# Patient Record
Sex: MALE | Race: WHITE | NOT HISPANIC OR LATINO | Employment: OTHER | URBAN - NONMETROPOLITAN AREA
[De-identification: names, ages, dates, MRNs, and addresses within clinical notes are randomized per-mention and may not be internally consistent; named-entity substitution may affect disease eponyms.]

---

## 2022-03-24 ENCOUNTER — OFFICE VISIT (OUTPATIENT)
Dept: FAMILY MEDICINE CLINIC | Facility: CLINIC | Age: 42
End: 2022-03-24

## 2022-03-24 VITALS
HEART RATE: 78 BPM | WEIGHT: 194 LBS | BODY MASS INDEX: 28.73 KG/M2 | SYSTOLIC BLOOD PRESSURE: 112 MMHG | HEIGHT: 69 IN | DIASTOLIC BLOOD PRESSURE: 84 MMHG | TEMPERATURE: 97.3 F | OXYGEN SATURATION: 98 %

## 2022-03-24 DIAGNOSIS — H91.92 HEARING DIFFICULTY OF LEFT EAR: Primary | Chronic | ICD-10-CM

## 2022-03-24 DIAGNOSIS — Z13.220 SCREENING FOR HYPERLIPIDEMIA: ICD-10-CM

## 2022-03-24 DIAGNOSIS — R47.9 SPEECH IMPEDIMENT: Chronic | ICD-10-CM

## 2022-03-24 DIAGNOSIS — F33.42 RECURRENT MAJOR DEPRESSIVE DISORDER, IN FULL REMISSION: Chronic | ICD-10-CM

## 2022-03-24 PROCEDURE — 99204 OFFICE O/P NEW MOD 45 MIN: CPT | Performed by: PHYSICIAN ASSISTANT

## 2022-03-25 PROBLEM — R47.9 SPEECH IMPEDIMENT: Chronic | Status: ACTIVE | Noted: 2022-03-25

## 2022-03-25 NOTE — PROGRESS NOTES
"Subjective   Chantelle Bonilla is a 42 y.o. male.       Chief Complaint -establish care and difficulty hearing    History of Present Illness -    ROS    He is here today to establish care as a new patient.  He states that he has been relatively healthy and does not take medication for any chronic issues.  His female friend is here with him today helping with the history and translation/understanding of speech.    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  He does have associated speech impediment.  Patient states that his grandmother was deaf.    Depression-  Patient does have a history of depression.  He states that he did take medication for this in the past but is not interested in taking medication at this time.  He does have some labile moods but states he is able to control with conservative measures.  He denies any hallucinations, SI or HI.    Hearing screening-  Whisper test  Greater than 5 feet right ear  Less than 5 feet left ear      The following portions of the patient's history were reviewed and updated as appropriate: allergies, current medications, past family history, past medical history, past social history, past surgical history and problem list.    Review of Systems    Objective  Vital signs:  /84   Pulse 78   Temp 97.3 °F (36.3 °C) (Temporal)   Ht 175.3 cm (69\")   Wt 88 kg (194 lb)   SpO2 98%   BMI 28.65 kg/m²     Physical Exam  Vitals and nursing note reviewed.   Constitutional:       Appearance: Normal appearance. He is well-developed.   HENT:      Head: Normocephalic and atraumatic.      Right Ear: Tympanic membrane normal.      Left Ear: Tympanic membrane normal.      Nose: Nose normal.      Mouth/Throat:      Mouth: Mucous membranes are moist.      Pharynx: No oropharyngeal exudate or posterior oropharyngeal erythema.      Comments: Patient does have speech impediment  Eyes:      Extraocular Movements: Extraocular movements intact. "      Conjunctiva/sclera: Conjunctivae normal.   Neck:      Thyroid: No thyromegaly.      Trachea: No tracheal deviation.   Cardiovascular:      Rate and Rhythm: Normal rate and regular rhythm.      Heart sounds: Normal heart sounds. No murmur heard.  Pulmonary:      Effort: Pulmonary effort is normal. No respiratory distress.      Breath sounds: Normal breath sounds. No wheezing.   Abdominal:      General: Bowel sounds are normal.      Palpations: Abdomen is soft.      Tenderness: There is no abdominal tenderness. There is no guarding.   Musculoskeletal:         General: No tenderness. Normal range of motion.      Cervical back: Normal range of motion and neck supple.   Lymphadenopathy:      Cervical: No cervical adenopathy.   Skin:     General: Skin is warm and dry.      Findings: No rash.   Neurological:      General: No focal deficit present.      Mental Status: He is alert and oriented to person, place, and time.   Psychiatric:         Mood and Affect: Mood normal.         Behavior: Behavior normal.         Thought Content: Thought content normal.         The following data was reviewed by: JOHANNY Rico on 03/24/2022:    Discussed drawing lab work today appropriate for age                       Assessment/Plan     Diagnoses and all orders for this visit:    1. Hearing difficulty of left ear (Primary)  Comments:  Refer to ENT for formal hearing evaluation and further assessment  Orders:  -     Ambulatory Referral to ENT (Otolaryngology)    2. Recurrent major depressive disorder, in full remission (HCC)  Comments:  Advised conservative measures including breathing techniques  Advised to report any new or worsening symptoms  Orders:  -     Comprehensive Metabolic Panel; Future  -     CBC & Differential; Future  -     TSH; Future    3. Screening for hyperlipidemia  -     Lipid Panel; Future    4. Speech impediment  Comments:  Likely related to hearing loss since birth  Plan to refer to ENT/audiology for  further evaluation            Patient was given instructions and counseling regarding his condition or for health maintenance advice. Please see specific information pulled into the AVS if appropriate      This document has been electronically signed by:  Monse Walter PA-C

## 2022-03-31 ENCOUNTER — PATIENT ROUNDING (BHMG ONLY) (OUTPATIENT)
Dept: FAMILY MEDICINE CLINIC | Facility: CLINIC | Age: 42
End: 2022-03-31

## 2022-03-31 NOTE — PROGRESS NOTES
March 31, 2022    Hello, may I speak with Chantelle Bonilla?    My name is Denisse Kay      I am  with [facility]  [address] [place] [id number] [phone].    Before we get started may I verify your date of birth? 1980    I am calling to officially welcome you to our practice and ask about your recent visit. Is this a good time to talk? yes    Tell me about your visit with us. What things went well?  Everything was nice.        We're always looking for ways to make our patients' experiences even better. Do you have recommendations on ways we may improve?  no    Overall were you satisfied with your first visit to our practice? yes       I appreciate you taking the time to speak with me today. Is there anything else I can do for you? no      Thank you, and have a great day.

## 2022-04-01 ENCOUNTER — LAB (OUTPATIENT)
Dept: FAMILY MEDICINE CLINIC | Facility: CLINIC | Age: 42
End: 2022-04-01

## 2022-04-01 DIAGNOSIS — F33.42 RECURRENT MAJOR DEPRESSIVE DISORDER, IN FULL REMISSION: ICD-10-CM

## 2022-04-01 DIAGNOSIS — Z13.220 SCREENING FOR HYPERLIPIDEMIA: ICD-10-CM

## 2022-04-01 PROCEDURE — 85025 COMPLETE CBC W/AUTO DIFF WBC: CPT | Performed by: PHYSICIAN ASSISTANT

## 2022-04-01 PROCEDURE — 80053 COMPREHEN METABOLIC PANEL: CPT | Performed by: PHYSICIAN ASSISTANT

## 2022-04-01 PROCEDURE — 84443 ASSAY THYROID STIM HORMONE: CPT | Performed by: PHYSICIAN ASSISTANT

## 2022-04-01 PROCEDURE — 80061 LIPID PANEL: CPT | Performed by: PHYSICIAN ASSISTANT

## 2022-04-02 LAB
ALBUMIN SERPL-MCNC: 4.6 G/DL (ref 3.5–5.2)
ALBUMIN/GLOB SERPL: 1.8 G/DL
ALP SERPL-CCNC: 47 U/L (ref 39–117)
ALT SERPL W P-5'-P-CCNC: 20 U/L (ref 1–41)
ANION GAP SERPL CALCULATED.3IONS-SCNC: 13 MMOL/L (ref 5–15)
AST SERPL-CCNC: 19 U/L (ref 1–40)
BASOPHILS # BLD AUTO: 0.07 10*3/MM3 (ref 0–0.2)
BASOPHILS NFR BLD AUTO: 1.3 % (ref 0–1.5)
BILIRUB SERPL-MCNC: 0.5 MG/DL (ref 0–1.2)
BUN SERPL-MCNC: 9 MG/DL (ref 6–20)
BUN/CREAT SERPL: 7.6 (ref 7–25)
CALCIUM SPEC-SCNC: 8.8 MG/DL (ref 8.6–10.5)
CHLORIDE SERPL-SCNC: 104 MMOL/L (ref 98–107)
CHOLEST SERPL-MCNC: 140 MG/DL (ref 0–200)
CO2 SERPL-SCNC: 24 MMOL/L (ref 22–29)
CREAT SERPL-MCNC: 1.19 MG/DL (ref 0.76–1.27)
DEPRECATED RDW RBC AUTO: 43.2 FL (ref 37–54)
EGFRCR SERPLBLD CKD-EPI 2021: 78.7 ML/MIN/1.73
EOSINOPHIL # BLD AUTO: 0.46 10*3/MM3 (ref 0–0.4)
EOSINOPHIL NFR BLD AUTO: 8.8 % (ref 0.3–6.2)
ERYTHROCYTE [DISTWIDTH] IN BLOOD BY AUTOMATED COUNT: 13 % (ref 12.3–15.4)
GLOBULIN UR ELPH-MCNC: 2.5 GM/DL
GLUCOSE SERPL-MCNC: 86 MG/DL (ref 65–99)
HCT VFR BLD AUTO: 42.3 % (ref 37.5–51)
HDLC SERPL-MCNC: 42 MG/DL (ref 40–60)
HGB BLD-MCNC: 15.1 G/DL (ref 13–17.7)
IMM GRANULOCYTES # BLD AUTO: 0.01 10*3/MM3 (ref 0–0.05)
IMM GRANULOCYTES NFR BLD AUTO: 0.2 % (ref 0–0.5)
LDLC SERPL CALC-MCNC: 83 MG/DL (ref 0–100)
LDLC/HDLC SERPL: 1.98 {RATIO}
LYMPHOCYTES # BLD AUTO: 1.92 10*3/MM3 (ref 0.7–3.1)
LYMPHOCYTES NFR BLD AUTO: 36.7 % (ref 19.6–45.3)
MCH RBC QN AUTO: 32.6 PG (ref 26.6–33)
MCHC RBC AUTO-ENTMCNC: 35.7 G/DL (ref 31.5–35.7)
MCV RBC AUTO: 91.4 FL (ref 79–97)
MONOCYTES # BLD AUTO: 0.42 10*3/MM3 (ref 0.1–0.9)
MONOCYTES NFR BLD AUTO: 8 % (ref 5–12)
NEUTROPHILS NFR BLD AUTO: 2.35 10*3/MM3 (ref 1.7–7)
NEUTROPHILS NFR BLD AUTO: 45 % (ref 42.7–76)
NRBC BLD AUTO-RTO: 0.2 /100 WBC (ref 0–0.2)
PLATELET # BLD AUTO: 236 10*3/MM3 (ref 140–450)
PMV BLD AUTO: 11.2 FL (ref 6–12)
POTASSIUM SERPL-SCNC: 4.2 MMOL/L (ref 3.5–5.2)
PROT SERPL-MCNC: 7.1 G/DL (ref 6–8.5)
RBC # BLD AUTO: 4.63 10*6/MM3 (ref 4.14–5.8)
SODIUM SERPL-SCNC: 141 MMOL/L (ref 136–145)
TRIGL SERPL-MCNC: 75 MG/DL (ref 0–150)
TSH SERPL DL<=0.05 MIU/L-ACNC: 2.12 UIU/ML (ref 0.27–4.2)
VLDLC SERPL-MCNC: 15 MG/DL (ref 5–40)
WBC NRBC COR # BLD: 5.23 10*3/MM3 (ref 3.4–10.8)

## 2022-04-18 ENCOUNTER — TELEPHONE (OUTPATIENT)
Dept: FAMILY MEDICINE CLINIC | Facility: CLINIC | Age: 42
End: 2022-04-18

## 2022-04-18 NOTE — TELEPHONE ENCOUNTER
Caller: MELONIE OLIVIER    Relationship: Emergency Contact    Best call back number: [phone]    What does billing need from the patient: PATIENT HAS RECEIVED A BILL FROM YOUR OFFICE AND SHOULD HAVE BEEN COVERED BY HIS MEDICARE

## 2022-07-26 ENCOUNTER — TELEPHONE (OUTPATIENT)
Dept: FAMILY MEDICINE CLINIC | Facility: CLINIC | Age: 42
End: 2022-07-26

## 2022-07-26 NOTE — TELEPHONE ENCOUNTER
Caller: Quincy Bonilla    Relationship: Self    Best call back number: [phone]     What is the best time to reach you: ANYTIME  Who are you requesting to speak with (clinical staff, provider,  specific staff member): CLINICAL - GLADIS BLACK     Do you know the name of the person who called: QUINCY  What was the call regarding: THE PATIENT REPORTS THAT THE SPECIALIST THAT GLADIS BLACK SENT HIS TO AT [facility] WAS SUPPOSED TO DO A TEST AND THE PATIENT REPORTS THAT THEY DID NOT TELL THE PATIENT ANY INFORMATION ABOUT THE TEST OR VISIT. THE PATIENT STATES HE WENT TO THE APPOINTMENT TODAY, 07/26/2022 AND STATES THIS WAS HIS SECOND APPOINTMENT WITH THIS. THE PATIENT STATES THEY LOOKED AT HIS EARS BUT NOTHING ELSE.   Do you require a callback: YES, THE PATIENT IS REQUESTING A CALL BACK PLEASE.

## 2022-07-29 ENCOUNTER — OFFICE VISIT (OUTPATIENT)
Dept: FAMILY MEDICINE CLINIC | Facility: CLINIC | Age: 42
End: 2022-07-29

## 2022-07-29 VITALS
DIASTOLIC BLOOD PRESSURE: 80 MMHG | OXYGEN SATURATION: 100 % | HEART RATE: 77 BPM | WEIGHT: 189 LBS | HEIGHT: 69 IN | SYSTOLIC BLOOD PRESSURE: 108 MMHG | BODY MASS INDEX: 27.99 KG/M2 | TEMPERATURE: 98.3 F

## 2022-07-29 DIAGNOSIS — R47.9 SPEECH IMPEDIMENT: Chronic | ICD-10-CM

## 2022-07-29 DIAGNOSIS — H91.92 HEARING DIFFICULTY OF LEFT EAR: Primary | Chronic | ICD-10-CM

## 2022-07-29 DIAGNOSIS — F33.42 RECURRENT MAJOR DEPRESSIVE DISORDER, IN FULL REMISSION: Chronic | ICD-10-CM

## 2022-07-29 PROCEDURE — 99213 OFFICE O/P EST LOW 20 MIN: CPT | Performed by: PHYSICIAN ASSISTANT

## 2022-07-29 NOTE — PROGRESS NOTES
"Subjective   Chantelle Bonilla is a 42 y.o. male.       Chief Complaint -hearing difficulty    History of Present Illness -    ROS    Hearing difficulty-  Patient complains of hearing difficulty.  He was recently seen by ENT specialist but states that he was unsure of the assessment and plan at the end of the visit as he is girlfriend was unable to come back into that visit with him and he did not understand the plan.    Speech impediment-  Not at goal but stable    Depression-  Controlled with conservative measures.  He denies any SI HI or hallucinations.    The following portions of the patient's history were reviewed and updated as appropriate: allergies, current medications, past family history, past medical history, past social history, past surgical history and problem list.    Review of Systems    Objective  Vital signs:  /80   Pulse 77   Temp 98.3 °F (36.8 °C) (Temporal)   Ht 175.3 cm (69\")   Wt 85.7 kg (189 lb)   SpO2 100%   BMI 27.91 kg/m²     Physical Exam  Vitals and nursing note reviewed.   Constitutional:       General: He is not in acute distress.     Appearance: Normal appearance. He is well-developed. He is not diaphoretic.   HENT:      Head: Normocephalic and atraumatic.      Right Ear: Tympanic membrane, ear canal and external ear normal.      Left Ear: Ear canal and external ear normal.      Ears:      Comments: Clear fluid noted behind left TM without bulging or erythema appreciated     Nose: Nose normal.      Mouth/Throat:      Mouth: Mucous membranes are moist.      Pharynx: No oropharyngeal exudate or posterior oropharyngeal erythema.      Comments: Patient noted to have speech impediment  Eyes:      Extraocular Movements: Extraocular movements intact.      Conjunctiva/sclera: Conjunctivae normal.   Neck:      Thyroid: No thyromegaly.   Cardiovascular:      Rate and Rhythm: Normal rate and regular rhythm.      Heart sounds: Normal heart sounds. No murmur heard.  Pulmonary:      Effort: " Pulmonary effort is normal. No respiratory distress.      Breath sounds: Normal breath sounds. No wheezing or rales.   Musculoskeletal:         General: No tenderness.      Cervical back: Normal range of motion and neck supple.   Lymphadenopathy:      Cervical: No cervical adenopathy.   Skin:     General: Skin is warm and dry.      Findings: No rash.   Neurological:      Mental Status: He is alert and oriented to person, place, and time.   Psychiatric:         Mood and Affect: Mood normal.         Behavior: Behavior normal.         Thought Content: Thought content normal.         The following data was reviewed by: JOHANNY Rico on 07/29/2022:  CMP    CMP 4/1/22   Glucose 86   BUN 9   Creatinine 1.19   Sodium 141   Potassium 4.2   Chloride 104   Calcium 8.8   Albumin 4.60   Total Bilirubin 0.5   Alkaline Phosphatase 47   AST (SGOT) 19   ALT (SGPT) 20           CBC w/diff    CBC w/Diff 4/1/22   WBC 5.23   RBC 4.63   Hemoglobin 15.1   Hematocrit 42.3   MCV 91.4   MCH 32.6   MCHC 35.7   RDW 13.0   Platelets 236   Neutrophil Rel % 45.0   Immature Granulocyte Rel % 0.2   Lymphocyte Rel % 36.7   Monocyte Rel % 8.0   Eosinophil Rel % 8.8 (A)   Basophil Rel % 1.3   (A) Abnormal value            Lipid Panel    Lipid Panel 4/1/22   Total Cholesterol 140   Triglycerides 75   HDL Cholesterol 42   VLDL Cholesterol 15   LDL Cholesterol  83   LDL/HDL Ratio 1.98           TSH    TSH 4/1/22   TSH 2.120                      Assessment & Plan     Diagnoses and all orders for this visit:    1. Hearing difficulty of left ear (Primary)  Comments:  Request office notes/records from [facility]  Plan to contact patient after ENT plan is reviewed    2. Speech impediment  Comments:  Conservative measures advised    3. Recurrent major depressive disorder, in full remission (HCC)  Comments:  Conservative measures advised            Patient was given instructions and counseling regarding his condition or for health maintenance  advice. Please see specific information pulled into the AVS if appropriate      This document has been electronically signed by:  Monse Walter PA-C

## 2022-08-03 ENCOUNTER — TRANSCRIBE ORDERS (OUTPATIENT)
Dept: ADMINISTRATIVE | Facility: HOSPITAL | Age: 42
End: 2022-08-03

## 2022-08-03 DIAGNOSIS — H90.41 SENSORINEURAL HEARING LOSS, UNILATERAL, RIGHT EAR, WITH UNRESTRICTED HEARING ON THE CONTRALATERAL SIDE: Primary | ICD-10-CM

## 2022-10-21 ENCOUNTER — TELEPHONE (OUTPATIENT)
Dept: FAMILY MEDICINE CLINIC | Facility: CLINIC | Age: 42
End: 2022-10-21

## 2022-10-21 NOTE — TELEPHONE ENCOUNTER
Caller: Chantelle Bonilla    Relationship: Self    Best call back number: [phone]    What was the call regarding: PATIENT CALLED STATING THAT HE WILL NOT BE ABLE TO SEE GLADIS BLACK ANY MORE.  PATIENT STATED THAT HE NOW .     Do you require a callback: YES

## 2023-04-20 ENCOUNTER — TELEPHONE (OUTPATIENT)
Dept: FAMILY MEDICINE CLINIC | Facility: CLINIC | Age: 43
End: 2023-04-20

## 2023-04-20 NOTE — TELEPHONE ENCOUNTER
Yes.  I wrote a letter in the patient's chart.  She can either have the  printed out and fax it to [facility] or she can look under my chart for letters to find it.

## 2023-04-20 NOTE — TELEPHONE ENCOUNTER
Caller: MELONIE SHERMAN    Relationship: Emergency Contact    Best call back number: [phone]     What form or medical record are you requesting: FORM FOR [facility]     Who is requesting this form or medical record from you: [facility] [phone]    How would you like to receive the form or medical records (pick-up, mail, fax): FAX  If fax, what is the fax number: UNKNOWN    Timeframe paperwork needed: AS SOON AS POSSIBLE    Additional notes: THE PATIENT'S CONTACT NEEDS A STATEMENT THAT GIVES HER PERMISSION TO RIDE WITH THE PATIENT DUE TO THE PATIENT NEEDING ASSISTANCE DURING HIS APPOINTMENT.    PLEASE CALL TO ADVISE AS SOON AS POSSIBLE.     THANK YOU.

## 2023-04-25 ENCOUNTER — OFFICE VISIT (OUTPATIENT)
Dept: FAMILY MEDICINE CLINIC | Facility: CLINIC | Age: 43
End: 2023-04-25
Payer: MEDICAID

## 2023-04-25 VITALS
HEIGHT: 69 IN | SYSTOLIC BLOOD PRESSURE: 100 MMHG | HEART RATE: 78 BPM | BODY MASS INDEX: 29.18 KG/M2 | WEIGHT: 197 LBS | OXYGEN SATURATION: 96 % | DIASTOLIC BLOOD PRESSURE: 80 MMHG | TEMPERATURE: 98.5 F

## 2023-04-25 DIAGNOSIS — F51.04 PSYCHOPHYSIOLOGICAL INSOMNIA: Primary | Chronic | ICD-10-CM

## 2023-04-25 DIAGNOSIS — N46.9 INFERTILITY MALE: ICD-10-CM

## 2023-04-25 PROCEDURE — 99213 OFFICE O/P EST LOW 20 MIN: CPT | Performed by: PHYSICIAN ASSISTANT

## 2023-04-25 PROCEDURE — 1160F RVW MEDS BY RX/DR IN RCRD: CPT | Performed by: PHYSICIAN ASSISTANT

## 2023-04-25 PROCEDURE — 1159F MED LIST DOCD IN RCRD: CPT | Performed by: PHYSICIAN ASSISTANT

## 2023-04-25 RX ORDER — QUETIAPINE FUMARATE 50 MG/1
50 TABLET, FILM COATED ORAL NIGHTLY
Qty: 30 TABLET | Refills: 3 | Status: SHIPPED | OUTPATIENT
Start: 2023-04-25

## 2023-04-25 NOTE — PATIENT INSTRUCTIONS
Calorie Counting for Weight Loss  Calories are units of energy. Your body needs a certain number of calories from food to keep going throughout the day. When you eat or drink more calories than your body needs, your body stores the extra calories mostly as fat. When you eat or drink fewer calories than your body needs, your body burns fat to get the energy it needs.  Calorie counting means keeping track of how many calories you eat and drink each day. Calorie counting can be helpful if you need to lose weight. If you eat fewer calories than your body needs, you should lose weight. Ask your health care provider what a healthy weight is for you.  For calorie counting to work, you will need to eat the right number of calories each day to lose a healthy amount of weight per week. A dietitian can help you figure out how many calories you need in a day and will suggest ways to reach your calorie goal.  A healthy amount of weight to lose each week is usually 1-2 lb (0.5-0.9 kg). This usually means that your daily calorie intake should be reduced by 500-750 calories.  Eating 1,200-1,500 calories a day can help most women lose weight.  Eating 1,500-1,800 calories a day can help most men lose weight.  What do I need to know about calorie counting?  Work with your health care provider or dietitian to determine how many calories you should get each day. To meet your daily calorie goal, you will need to:  Find out how many calories are in each food that you would like to eat. Try to do this before you eat.  Decide how much of the food you plan to eat.  Keep a food log. Do this by writing down what you ate and how many calories it had.  To successfully lose weight, it is important to balance calorie counting with a healthy lifestyle that includes regular activity.  Where do I find calorie information?  The number of calories in a food can be found on a Nutrition Facts label. If a food does not have a Nutrition Facts label, try to  look up the calories online or ask your dietitian for help.  Remember that calories are listed per serving. If you choose to have more than one serving of a food, you will have to multiply the calories per serving by the number of servings you plan to eat. For example, the label on a package of bread might say that a serving size is 1 slice and that there are 90 calories in a serving. If you eat 1 slice, you will have eaten 90 calories. If you eat 2 slices, you will have eaten 180 calories.  How do I keep a food log?  After each time that you eat, record the following in your food log as soon as possible:  What you ate. Be sure to include toppings, sauces, and other extras on the food.  How much you ate. This can be measured in cups, ounces, or number of items.  How many calories were in each food and drink.  The total number of calories in the food you ate.  Keep your food log near you, such as in a pocket-sized notebook or on an marissa or website on your mobile phone. Some programs will calculate calories for you and show you how many calories you have left to meet your daily goal.  What are some portion-control tips?  Know how many calories are in a serving. This will help you know how many servings you can have of a certain food.  Use a measuring cup to measure serving sizes. You could also try weighing out portions on a kitchen scale. With time, you will be able to estimate serving sizes for some foods.  Take time to put servings of different foods on your favorite plates or in your favorite bowls and cups so you know what a serving looks like.  Try not to eat straight from a food's packaging, such as from a bag or box. Eating straight from the package makes it hard to see how much you are eating and can lead to overeating. Put the amount you would like to eat in a cup or on a plate to make sure you are eating the right portion.  Use smaller plates, glasses, and bowls for smaller portions and to prevent  overeating.  Try not to multitask. For example, avoid watching TV or using your computer while eating. If it is time to eat, sit down at a table and enjoy your food. This will help you recognize when you are full. It will also help you be more mindful of what and how much you are eating.  What are tips for following this plan?  Reading food labels  Check the calorie count compared with the serving size. The serving size may be smaller than what you are used to eating.  Check the source of the calories. Try to choose foods that are high in protein, fiber, and vitamins, and low in saturated fat, trans fat, and sodium.  Shopping  Read nutrition labels while you shop. This will help you make healthy decisions about which foods to buy.  Pay attention to nutrition labels for low-fat or fat-free foods. These foods sometimes have the same number of calories or more calories than the full-fat versions. They also often have added sugar, starch, or salt to make up for flavor that was removed with the fat.  Make a grocery list of lower-calorie foods and stick to it.  Cooking  Try to cook your favorite foods in a healthier way. For example, try baking instead of frying.  Use low-fat dairy products.  Meal planning  Use more fruits and vegetables. One-half of your plate should be fruits and vegetables.  Include lean proteins, such as chicken, turkey, and fish.  Lifestyle  Each week, aim to do one of the followin minutes of moderate exercise, such as walking.  75 minutes of vigorous exercise, such as running.  General information  Know how many calories are in the foods you eat most often. This will help you calculate calorie counts faster.  Find a way of tracking calories that works for you. Get creative. Try different apps or programs if writing down calories does not work for you.  What foods should I eat?    Eat nutritious foods. It is better to have a nutritious, high-calorie food, such as an avocado, than a food with  few nutrients, such as a bag of potato chips.  Use your calories on foods and drinks that will fill you up and will not leave you hungry soon after eating.  Examples of foods that fill you up are nuts and nut butters, vegetables, lean proteins, and high-fiber foods such as whole grains. High-fiber foods are foods with more than 5 g of fiber per serving.  Pay attention to calories in drinks. Low-calorie drinks include water and unsweetened drinks.  The items listed above may not be a complete list of foods and beverages you can eat. Contact a dietitian for more information.  What foods should I limit?  Limit foods or drinks that are not good sources of vitamins, minerals, or protein or that are high in unhealthy fats. These include:  Candy.  Other sweets.  Sodas, specialty coffee drinks, alcohol, and juice.  The items listed above may not be a complete list of foods and beverages you should avoid. Contact a dietitian for more information.  How do I count calories when eating out?  Pay attention to portions. Often, portions are much larger when eating out. Try these tips to keep portions smaller:  Consider sharing a meal instead of getting your own.  If you get your own meal, eat only half of it. Before you start eating, ask for a container and put half of your meal into it.  When available, consider ordering smaller portions from the menu instead of full portions.  Pay attention to your food and drink choices. Knowing the way food is cooked and what is included with the meal can help you eat fewer calories.  If calories are listed on the menu, choose the lower-calorie options.  Choose dishes that include vegetables, fruits, whole grains, low-fat dairy products, and lean proteins.  Choose items that are boiled, broiled, grilled, or steamed. Avoid items that are buttered, battered, fried, or served with cream sauce. Items labeled as crispy are usually fried, unless stated otherwise.  Choose water, low-fat milk,  unsweetened iced tea, or other drinks without added sugar. If you want an alcoholic beverage, choose a lower-calorie option, such as a glass of wine or light beer.  Ask for dressings, sauces, and syrups on the side. These are usually high in calories, so you should limit the amount you eat.  If you want a salad, choose a garden salad and ask for grilled meats. Avoid extra toppings such as bernabe, cheese, or fried items. Ask for the dressing on the side, or ask for olive oil and vinegar or lemon to use as dressing.  Estimate how many servings of a food you are given. Knowing serving sizes will help you be aware of how much food you are eating at restaurants.  Where to find more information  Centers for Disease Control and Prevention: www.cdc.gov  [place] Department of Agriculture: myplate.gov  Summary  Calorie counting means keeping track of how many calories you eat and drink each day. If you eat fewer calories than your body needs, you should lose weight.  A healthy amount of weight to lose per week is usually 1-2 lb (0.5-0.9 kg). This usually means reducing your daily calorie intake by 500-750 calories.  The number of calories in a food can be found on a Nutrition Facts label. If a food does not have a Nutrition Facts label, try to look up the calories online or ask your dietitian for help.  Use smaller plates, glasses, and bowls for smaller portions and to prevent overeating.  Use your calories on foods and drinks that will fill you up and not leave you hungry shortly after a meal.  This information is not intended to replace advice given to you by your health care provider. Make sure you discuss any questions you have with your health care provider.  Document Revised: 01/28/2021 Document Reviewed: 01/28/2021  Elsevier Patient Education © 2022 [employer]

## 2023-04-25 NOTE — PROGRESS NOTES
"Subjective   Chantelle Bonilla is a 43 y.o. male.       Chief Complaint -sleeping problem    History of Present Illness -    ROS    Sleeping problem-  He complains of restless sleep stating that he sometimes will \"fight\" in his sleep.  His wife states that sometimes he will flail his arms or legs like he is fighting while asleep.  Onset greater than 6 months    Infertility-  He and his wife have been trying to get her pregnant for 1 year.  She states that she does have irregular periods.  The patient has questions about his own fertility.  Neither one of them has any children.    The following portions of the patient's history were reviewed and updated as appropriate: allergies, current medications, past family history, past medical history, past social history, past surgical history and problem list.    Review of Systems    Objective  Vital signs:  /80   Pulse 78   Temp 98.5 °F (36.9 °C) (Temporal)   Ht 175.3 cm (69\")   Wt 89.4 kg (197 lb)   SpO2 96%   BMI 29.09 kg/m²     Physical Exam  Vitals and nursing note reviewed.   Constitutional:       Appearance: Normal appearance. He is well-developed.   Eyes:      Extraocular Movements: Extraocular movements intact.      Conjunctiva/sclera: Conjunctivae normal.   Cardiovascular:      Rate and Rhythm: Normal rate and regular rhythm.      Heart sounds: Normal heart sounds. No murmur heard.  Pulmonary:      Effort: Pulmonary effort is normal. No respiratory distress.      Breath sounds: Normal breath sounds. No wheezing.   Musculoskeletal:         General: No tenderness.   Skin:     General: Skin is warm and dry.      Findings: No rash.   Neurological:      Mental Status: He is alert and oriented to person, place, and time.   Psychiatric:         Mood and Affect: Mood normal.         Behavior: Behavior normal.         Thought Content: Thought content normal.         The following data was reviewed by: JOHANNY Rico on 04/25/2023:                     "       Assessment & Plan     Diagnoses and all orders for this visit:    1. Psychophysiological insomnia (Primary)  Comments:  Start Seroquel nightly as this should help the patient to sleep deeper  Discussed potential side effects  Follow-up in 2 weeks or sooner if needed  Orders:  -     QUEtiapine (SEROquel) 50 MG tablet; Take 1 tablet by mouth Every Night.  Dispense: 30 tablet; Refill: 3    2. Infertility male  Comments:  Refer to urology for sperm count and to confirm his fertility status  Orders:  -     Ambulatory Referral to Urology        BMI is >= 25 and <30. (Overweight) The following options were offered after discussion;: exercise counseling/recommendations and nutrition counseling/recommendations          Patient was given instructions and counseling regarding his condition or for health maintenance advice. Please see specific information pulled into the AVS if appropriate      This document has been electronically signed by:  Monse Walter PA-C

## 2023-04-27 DIAGNOSIS — N46.9 INFERTILITY MALE: Primary | ICD-10-CM

## 2023-04-28 ENCOUNTER — TELEPHONE (OUTPATIENT)
Dept: FAMILY MEDICINE CLINIC | Facility: CLINIC | Age: 43
End: 2023-04-28
Payer: MEDICARE

## 2023-04-28 NOTE — TELEPHONE ENCOUNTER
Spoke with patient's significant other who is on verbal and let her know that he is scheduled for semen collection on 05/03/23 at 10:00 am. I let her know that Devorah, our  said for him to come by the office on Monday or Tuesday so she can explain to him what he needs to do before having this done. She is in understanding and will let the patient know.

## 2023-05-02 ENCOUNTER — OFFICE VISIT (OUTPATIENT)
Dept: FAMILY MEDICINE CLINIC | Facility: CLINIC | Age: 43
End: 2023-05-02
Payer: MEDICAID

## 2023-05-02 VITALS — HEIGHT: 69 IN | BODY MASS INDEX: 29.18 KG/M2 | WEIGHT: 197 LBS

## 2023-05-02 DIAGNOSIS — F51.04 PSYCHOPHYSIOLOGICAL INSOMNIA: Primary | Chronic | ICD-10-CM

## 2023-05-02 NOTE — PROGRESS NOTES
"Video Telehealth Visit    This service was conducted via video including audio/visual technology through the use of [employer].    The patient has lack of transportation which necessitates this telehealth service.    The patient is located at their home.  I am located at the [facility] office.  Other participants in this video visit include patient    You have chosen to receive care through a telehealth visit.  Do you consent to use a video/audio connection for your medical care today? Yes        Subjective   Chantelle Bonilla is a 43 y.o. male.       Chief Complaint -insomnia    History of Present Illness -       Insomnia-  Improved with use of trazodone.  Patient states that he is now able to go to sleep and stay asleep throughout the night.  He denies any unwanted side effects with the use of trazodone.  We did discuss potential side effects including weight gain and patient states that he weighs the same as he did when he started the medicine 2 weeks ago.    The following portions of the patient's history were reviewed and updated as appropriate: allergies, current medications, past family history, past medical history, past social history, past surgical history and problem list.        Objective  Vital signs:  Ht 175.3 cm (69.02\")   Wt 89.4 kg (197 lb)   BMI 29.08 kg/m²     Physical Exam  Vitals and nursing note reviewed.   Constitutional:       Appearance: Normal appearance.   HENT:      Head: Normocephalic and atraumatic.   Eyes:      Extraocular Movements: Extraocular movements intact.      Conjunctiva/sclera: Conjunctivae normal.   Pulmonary:      Effort: Pulmonary effort is normal.      Breath sounds: Normal breath sounds. No wheezing.      Comments: Breath sounds appear normal without use of accessory muscles or wheezing appreciated today  Musculoskeletal:      Cervical back: Normal range of motion and neck supple.   Skin:     General: Skin is dry.      Findings: No erythema or " rash.   Neurological:      General: No focal deficit present.      Mental Status: He is alert and oriented to person, place, and time.   Psychiatric:         Mood and Affect: Mood normal.         Thought Content: Thought content normal.         The following data was reviewed by: JOHANNY Rico on 05/02/2023:         Lab Results   Component Value Date    WBC 5.23 04/01/2022    HGB 15.1 04/01/2022    HCT 42.3 04/01/2022    MCV 91.4 04/01/2022     04/01/2022    CHOL 140 04/01/2022    TRIG 75 04/01/2022    HDL 42 04/01/2022    LDL 83 04/01/2022    TSH 2.120 04/01/2022             Assessment & Plan     Diagnoses and all orders for this visit:    1. Psychophysiological insomnia (Primary)        BMI is >= 25 and <30. (Overweight) The following options were offered after discussion;: exercise counseling/recommendations and nutrition counseling/recommendations          Patient was given instructions and counseling regarding his condition or for health maintenance advice. Please see specific information pulled into the AVS if appropriate      This document has been electronically signed by:  Monse Walter PA-C

## 2023-05-02 NOTE — LETTER
May 2, 2023     Patient: Chantelle Bonilla   YOB: 1980   Date of Visit: 5/2/2023       To Whom it May Concern:    Please excuse Chantelle Bonilla from school/work on 5/2/2023 due to medical issue/illness.           Sincerely,          JOHANNY Rico        CC: No Recipients

## 2023-06-05 ENCOUNTER — OFFICE VISIT (OUTPATIENT)
Dept: FAMILY MEDICINE CLINIC | Facility: CLINIC | Age: 43
End: 2023-06-05
Payer: MEDICAID

## 2023-06-05 VITALS
OXYGEN SATURATION: 98 % | TEMPERATURE: 97.9 F | HEIGHT: 69 IN | HEART RATE: 68 BPM | DIASTOLIC BLOOD PRESSURE: 65 MMHG | BODY MASS INDEX: 28.5 KG/M2 | SYSTOLIC BLOOD PRESSURE: 100 MMHG | WEIGHT: 192.4 LBS

## 2023-06-05 DIAGNOSIS — F51.04 PSYCHOPHYSIOLOGICAL INSOMNIA: Chronic | ICD-10-CM

## 2023-06-05 DIAGNOSIS — Z00.00 HEALTH MAINTENANCE EXAMINATION: Primary | ICD-10-CM

## 2023-06-05 RX ORDER — QUETIAPINE FUMARATE 50 MG/1
50 TABLET, FILM COATED ORAL NIGHTLY
Qty: 30 TABLET | Refills: 3 | Status: SHIPPED | OUTPATIENT
Start: 2023-06-05

## 2023-06-05 NOTE — PROGRESS NOTES
The ABCs of the Annual Wellness Visit  Subsequent Medicare Wellness Visit    Subjective    Chantelle Bonilla is a 43 y.o. male who presents for a Subsequent Medicare Wellness Visit.  Past medical history is significant for chronic speech impediment, major depressive disorder, psychophysiologic insomnia, and chronic decreased hearing in the left ear.    The following portions of the patient's history were reviewed and   updated as appropriate: allergies, current medications, past family history, past medical history, past social history, past surgical history, and problem list.    Compared to one year ago, the patient feels his physical   health is the same.    Compared to one year ago, the patient feels his mental   health is the same.    Recent Hospitalizations:  He was not admitted to the hospital during the last year.     Major depressive disorder:  Psychophysiological insomnia:  Symptoms are fairly well controlled on Seroquel 50 mg nightly.  He is sleeping well.  Denies any acute concerns.  He reports his depression is controlled.    Chewing tobacco:  He has chewed tobacco for quite some time.  He occasionally will smoke a cigarette, but does not smoke routinely.    Low Blood pressure:  BP in the office today is 100/65. He will occasionally have some dizziness at times. He does drink fluids, however, generally drinks caffeinated pop.  He does frequently have a dry mouth.    Current Medical Providers:  Patient Care Team:  Giselle Burnette PA as PCP - General (Physician Assistant)    Outpatient Medications Prior to Visit   Medication Sig Dispense Refill    QUEtiapine (SEROquel) 50 MG tablet Take 1 tablet by mouth Every Night. 30 tablet 3     No facility-administered medications prior to visit.     No opioid medication identified on active medication list. I have reviewed chart for other potential  high risk medication/s and harmful drug interactions in the elderly.        Aspirin is not on active medication  "list.  Aspirin use is not indicated based on review of current medical condition/s. Risk of harm outweighs potential benefits.  .    Patient Active Problem List   Diagnosis    Recurrent major depressive disorder, in full remission    Hearing difficulty of left ear    Speech impediment    Psychophysiological insomnia     Advance Care Planning   Advance Care Planning     Advance Directive is not on file.  ACP discussion was held with the patient during this visit. Patient does not have an advance directive, information provided.     Objective    Vitals:    06/05/23 1355   BP: 100/65   Pulse: 68   Temp: 97.9 °F (36.6 °C)   TempSrc: Temporal   SpO2: 98%   Weight: 87.3 kg (192 lb 6.4 oz)   Height: 175.3 cm (69.02\")     Estimated body mass index is 28.4 kg/m² as calculated from the following:    Height as of this encounter: 175.3 cm (69.02\").    Weight as of this encounter: 87.3 kg (192 lb 6.4 oz).    BMI is >= 25 and <30. (Overweight) The following options were offered after discussion;: weight loss educational material (shared in after visit summary)    Does the patient have evidence of cognitive impairment? Yes. Chronic. Stable memory trouble.             HEALTH RISK ASSESSMENT    Smoking Status:  Social History     Tobacco Use   Smoking Status Some Days    Passive exposure: Current   Smokeless Tobacco Current    Types: Snuff     Alcohol Consumption:  Social History     Substance and Sexual Activity   Alcohol Use Never     Fall Risk Screen:    KATLYNADI Fall Risk Assessment was completed, and patient is at LOW risk for falls.Assessment completed on:6/5/2023    Depression Screening:     Row Labels 6/5/2023     1:49 PM   PHQ-2/PHQ-9 Depression Screening   Section Header. No data exists in this row.    Little Interest or Pleasure in Doing Things   0-->not at all   Feeling Down, Depressed or Hopeless   0-->not at all   PHQ-9: Brief Depression Severity Measure Score   0       Health Habits and Functional and Cognitive " Screening:     Row Labels 6/5/2023     1:58 PM   Functional & Cognitive Status   Section Header. No data exists in this row.    Do you have difficulty preparing food and eating?   No   Do you have difficulty bathing yourself, getting dressed or grooming yourself?   No   Do you have difficulty using the toilet?   No   Do you have difficulty moving around from place to place?   No   Do you have trouble with steps or getting out of a bed or a chair?   No   Current Diet   Limited Junk Food   Dental Exam   Not up to date   Eye Exam   Not up to date   Do you need help using the phone?    No   Are you deaf or do you have serious difficulty hearing?    No   Do you need help with transportation?   No   Do you need help shopping?   No   Do you need help preparing meals?    No   Do you need help with housework?    No   Do you need help with laundry?   No   Do you need help taking your medications?   No   Do you need help managing money?   No   Do you ever drive or ride in a car without wearing a seat belt?   No   Have you felt unusual stress, anger or loneliness in the last month?   No   Who do you live with?   Spouse   Have you been bothered in the last four weeks by sexual problems?   No   Do you have difficulty concentrating, remembering or making decisions?   Yes     Age-appropriate Screening Schedule:  Refer to the list below for future screening recommendations based on patient's age, sex and/or medical conditions. Orders for these recommended tests are listed in the plan section. The patient has been provided with a written plan.    Health Maintenance   Topic Date Due    COVID-19 Vaccine (1) Never done    Pneumococcal Vaccine 0-64 (1 - PCV) Never done    TDAP/TD VACCINES (1 - Tdap) Never done-Had 5 months ago with finger injury.    HEPATITIS C SCREENING  Never done    ANNUAL WELLNESS VISIT  Never done    INFLUENZA VACCINE  08/01/2023        CMS Preventative Services Quick Reference  Risk Factors Identified During  Encounter  Immunizations Discussed/Encouraged: Reports Tdap was updated in Jan 2023.  Tobacco Use/Dependance Risk: Discussed risk of continued chewing tobacco including oropharyngeal and esophageal cancers.  Encouraged on cutting back and trying to quit.  The above risks/problems have been discussed with the patient.  Pertinent information has been shared with the patient in the After Visit Summary.  An After Visit Summary and PPPS were made available to the patient.    Major depressive disorder:  Psychophysiological insomnia:  Controlled on Seroquel, continue.  Refill sent.    Chewing tobacco:  Encouraged cessation.    Low blood pressure:  Encouraged limiting caffeinated beverages and increasing fluid intake with water or Gatorade.  Routine labs pending prior to next visit.    Follow Up:   Next Medicare Wellness visit to be scheduled in 1 year.       This document has been electronically signed by JOHANNY Olivares   June 5, 2023 16:04 EDT    Please note that portions of this note were completed with a voice recognition program. Efforts were made to edit dictation, but occasionally words are mistranscribed.

## 2023-07-06 ENCOUNTER — TELEPHONE (OUTPATIENT)
Dept: FAMILY MEDICINE CLINIC | Facility: CLINIC | Age: 43
End: 2023-07-06

## 2023-07-06 NOTE — TELEPHONE ENCOUNTER
Caller: MELONIE SHERMAN    Relationship: Emergency Contact    Best call back number: [phone]     What was the call regarding:   PATIENT'S (WIFE) MELONIE STATED THAT PATIENT IS NEEDING A LETTER FROM GLADIS ORLANDO TO [facility] STATING THAT IT IS OKAY FOR HIM TO RIDE THE BUS WITH MELONIE FOR HER DOCTOR APPOINTMENT'S

## 2023-07-06 NOTE — TELEPHONE ENCOUNTER
Inform the patient that I wrote a letter for [facility].  It is in his chart.  She can access it through letters on [employer].  If she needs us to fax it to them then we can also do that for her.

## 2023-08-15 ENCOUNTER — TELEPHONE (OUTPATIENT)
Dept: FAMILY MEDICINE CLINIC | Facility: CLINIC | Age: 43
End: 2023-08-15
Payer: MEDICAID

## 2023-08-15 NOTE — TELEPHONE ENCOUNTER
CALLER MADE CONTACT TO RETURN MISSED CALL FROM RENEA LARIOS RELAYED MESSAGE AS GIVEN:    Renea Garcia MA     TM     8:49 AM  Note      Left vm for pt to return call.   We have received a letter of medical necessity for [facility] to transport patient to Dr aMbel PAULSON dentist in [facility]. We need to know what treatment pt is receiving at this dentist office to place on form to be able to fax back to [facility]. - HUB TO READ.         CALLER STATED PATIENT IS SCHEDULED FOR A NEW PATIENT APPOINTMENT FOR A BASIC CHECK   1

## 2023-08-15 NOTE — TELEPHONE ENCOUNTER
Left vm for pt to return call.   We have received a letter of medical necessity for [facility] to transport patient to Dr Mabel PAULSON dentist in [facility]. We need to know what treatment pt is receiving at this dentist office to place on form to be able to fax back to [facility]. - HUB TO READ.

## 2023-08-21 RX ORDER — PERMETHRIN 50 MG/G
1 CREAM TOPICAL ONCE
Qty: 60 G | Refills: 0 | Status: SHIPPED | OUTPATIENT
Start: 2023-08-21 | End: 2023-08-21

## 2023-08-29 ENCOUNTER — OFFICE VISIT (OUTPATIENT)
Dept: FAMILY MEDICINE CLINIC | Facility: CLINIC | Age: 43
End: 2023-08-29
Payer: MEDICAID

## 2023-08-29 VITALS
HEIGHT: 69 IN | HEART RATE: 78 BPM | OXYGEN SATURATION: 98 % | DIASTOLIC BLOOD PRESSURE: 70 MMHG | TEMPERATURE: 97.2 F | BODY MASS INDEX: 27.25 KG/M2 | WEIGHT: 184 LBS | SYSTOLIC BLOOD PRESSURE: 126 MMHG

## 2023-08-29 DIAGNOSIS — L03.313 CELLULITIS OF CHEST WALL: Primary | ICD-10-CM

## 2023-08-29 DIAGNOSIS — F33.42 RECURRENT MAJOR DEPRESSIVE DISORDER, IN FULL REMISSION: Chronic | ICD-10-CM

## 2023-08-29 DIAGNOSIS — F51.01 PRIMARY INSOMNIA: Chronic | ICD-10-CM

## 2023-08-29 PROCEDURE — 1160F RVW MEDS BY RX/DR IN RCRD: CPT | Performed by: PHYSICIAN ASSISTANT

## 2023-08-29 PROCEDURE — 99214 OFFICE O/P EST MOD 30 MIN: CPT | Performed by: PHYSICIAN ASSISTANT

## 2023-08-29 PROCEDURE — 1159F MED LIST DOCD IN RCRD: CPT | Performed by: PHYSICIAN ASSISTANT

## 2023-08-29 RX ORDER — SULFAMETHOXAZOLE AND TRIMETHOPRIM 800; 160 MG/1; MG/1
1 TABLET ORAL 2 TIMES DAILY
Qty: 20 TABLET | Refills: 0 | Status: SHIPPED | OUTPATIENT
Start: 2023-08-29

## 2023-08-29 NOTE — PROGRESS NOTES
"Subjective   Chantelle Bonilla is a 43 y.o. male.       Chief Complaint -skin redness    History of Present Illness -       Skin redness-  He complains of redness and irritation on the left chest after bug bites recently.  Onset 1 week    Insomnia-  Significantly improved with the use of melatonin.    Depression-  Stable with out medication at this time.  He denies any hallucinations, SI or HI,    The following portions of the patient's history were reviewed and updated as appropriate: allergies, current medications, past family history, past medical history, past social history, past surgical history and problem list.        Objective  Vital signs:  /70   Pulse 78   Temp 97.2 øF (36.2 øC) (Temporal)   Ht 175.3 cm (69\")   Wt 83.5 kg (184 lb)   SpO2 98%   BMI 27.17 kg/mý     Physical Exam  Vitals and nursing note reviewed.   Constitutional:       Appearance: Normal appearance. He is well-developed.   Eyes:      Extraocular Movements: Extraocular movements intact.      Conjunctiva/sclera: Conjunctivae normal.   Cardiovascular:      Rate and Rhythm: Normal rate and regular rhythm.      Heart sounds: Normal heart sounds. No murmur heard.  Pulmonary:      Effort: Pulmonary effort is normal. No respiratory distress.      Breath sounds: Normal breath sounds. No wheezing.   Musculoskeletal:         General: No tenderness.   Skin:     General: Skin is warm and dry.      Findings: Erythema present. No rash.      Comments: Erythematous soft tissue noted left chest approximately 10 x 10 cm    Neurological:      Mental Status: He is alert and oriented to person, place, and time.   Psychiatric:         Mood and Affect: Mood normal.         Behavior: Behavior normal.         Thought Content: Thought content normal.       The following data was reviewed by Monse Walter PA-C:         Lab Results   Component Value Date    BUN 16 06/23/2023    CREATININE 1.20 06/23/2023    EGFR 77.4 06/23/2023    ALT 19 06/23/2023    AST 15 " 06/23/2023    WBC 6.93 06/23/2023    HGB 15.0 06/23/2023    HCT 42.9 06/23/2023     06/23/2023    CHOL 151 06/23/2023    TRIG 60 06/23/2023    HDL 42 06/23/2023    LDL 97 06/23/2023    TSH 1.200 06/23/2023    HGBA1C 5.00 06/23/2023           Assessment & Plan     Diagnoses and all orders for this visit:    1. Cellulitis of chest wall (Primary)  Comments:  Start Bactrim  Advised to keep the area clean with antibacterial soap  RTC if not improving within 1 week  Orders:  -     sulfamethoxazole-trimethoprim (Bactrim DS) 800-160 MG per tablet; Take 1 tablet by mouth 2 (Two) Times a Day.  Dispense: 20 tablet; Refill: 0    2. Primary insomnia  Comments:  Continue melatonin use  Advised tips for healthy sleep including avoidance of caffeine  Orders:  -     Melatonin 3 MG capsule; Take 1 capsule by mouth At Night As Needed (Sleep).  Dispense: 30 capsule; Refill: 5    3. Recurrent major depressive disorder, in full remission  Comments:  Advised conservative measures for dealing with depression and stress                   Patient was given instructions and counseling regarding his condition or for health maintenance advice. Please see specific information pulled into the AVS if appropriate      This document has been electronically signed by:  Monse Walter PA-C

## 2023-10-25 ENCOUNTER — TELEPHONE (OUTPATIENT)
Dept: FAMILY MEDICINE CLINIC | Facility: CLINIC | Age: 43
End: 2023-10-25

## 2023-10-25 NOTE — TELEPHONE ENCOUNTER
Caller: WHITMELONIE    Relationship: Emergency Contact    Best call back number: [phone]     What form or medical record are you requesting: STATEMENT THAT APPROVES PATIENT AND HIS WIFE MELONIE TO BE TRANSPORTED BY [facility]    Who is requesting this form or medical record from you: [facility] TRANSPORTATION    How would you like to receive the form or medical records (pick-up, mail, fax): FAX    If fax, what is the fax number: UNKNOWN (?)    Timeframe paperwork needed: ASAP

## 2023-12-21 ENCOUNTER — OFFICE VISIT (OUTPATIENT)
Dept: FAMILY MEDICINE CLINIC | Facility: CLINIC | Age: 43
End: 2023-12-21
Payer: MEDICAID

## 2023-12-21 VITALS
DIASTOLIC BLOOD PRESSURE: 72 MMHG | SYSTOLIC BLOOD PRESSURE: 114 MMHG | BODY MASS INDEX: 27.11 KG/M2 | TEMPERATURE: 97.8 F | HEIGHT: 69 IN | WEIGHT: 183 LBS | HEART RATE: 76 BPM | OXYGEN SATURATION: 98 %

## 2023-12-21 DIAGNOSIS — B37.9 CANDIDIASIS: Primary | ICD-10-CM

## 2023-12-21 PROBLEM — H91.90 HEARING LOSS: Status: ACTIVE | Noted: 2022-11-21

## 2023-12-21 PROBLEM — H91.90 HEARING LOSS: Status: RESOLVED | Noted: 2022-11-21 | Resolved: 2023-12-21

## 2023-12-21 PROCEDURE — 99213 OFFICE O/P EST LOW 20 MIN: CPT | Performed by: PHYSICIAN ASSISTANT

## 2023-12-21 PROCEDURE — 1159F MED LIST DOCD IN RCRD: CPT | Performed by: PHYSICIAN ASSISTANT

## 2023-12-21 PROCEDURE — 1160F RVW MEDS BY RX/DR IN RCRD: CPT | Performed by: PHYSICIAN ASSISTANT

## 2023-12-21 RX ORDER — QUETIAPINE FUMARATE 50 MG/1
TABLET, FILM COATED ORAL
COMMUNITY
Start: 2023-09-13 | End: 2023-12-21

## 2023-12-21 RX ORDER — FLUCONAZOLE 150 MG/1
150 TABLET ORAL DAILY
Qty: 10 TABLET | Refills: 0 | Status: SHIPPED | OUTPATIENT
Start: 2023-12-21

## 2023-12-21 NOTE — PROGRESS NOTES
"Chief Complaint -rash    History of Present Illness -     Chantelle Bonilla is a 43 y.o. male.     Rash-  Patient complains of a rash in his mid buttock fold with associated pruritus extending down to anus.  He reports intermittent dried red blood on toilet paper after defecation 2 weeks ago.  He denies any bright red blood in toilet water or black stool.      The following portions of the patient's history were reviewed and updated as appropriate: allergies, current medications, past family history, past medical history, past social history, past surgical history and problem list.        Objective  Vital signs:  /72   Pulse 76   Temp 97.8 °F (36.6 °C) (Temporal)   Ht 175.3 cm (69\")   Wt 83 kg (183 lb)   SpO2 98%   BMI 27.02 kg/m²     Physical Exam  Vitals and nursing note reviewed.   Constitutional:       Appearance: Normal appearance. He is well-developed.   Eyes:      Extraocular Movements: Extraocular movements intact.      Conjunctiva/sclera: Conjunctivae normal.   Cardiovascular:      Rate and Rhythm: Normal rate and regular rhythm.      Heart sounds: Normal heart sounds. No murmur heard.  Pulmonary:      Effort: Pulmonary effort is normal. No respiratory distress.      Breath sounds: Normal breath sounds. No wheezing.   Musculoskeletal:         General: No tenderness.   Skin:     General: Skin is warm and dry.      Findings: Rash present.      Comments: Erythematous moist skin noted mid buttock fold extending approximately 8 cm down to include anus with centralized skin tear   Neurological:      Mental Status: He is alert and oriented to person, place, and time.   Psychiatric:         Mood and Affect: Mood normal.         Behavior: Behavior normal.         Thought Content: Thought content normal.         The following data was reviewed by Monse Walter PA-C:         Lab Results   Component Value Date    BUN 16 06/23/2023    CREATININE 1.20 06/23/2023    EGFR 77.4 06/23/2023    ALT 19 06/23/2023    AST 15 " 06/23/2023    WBC 6.93 06/23/2023    HGB 15.0 06/23/2023    HCT 42.9 06/23/2023     06/23/2023    CHOL 151 06/23/2023    TRIG 60 06/23/2023    HDL 42 06/23/2023    LDL 97 06/23/2023    TSH 1.200 06/23/2023    HGBA1C 5.00 06/23/2023           Assessment & Plan     Diagnoses and all orders for this visit:    1. Candidiasis (Primary)  Comments:  Advised to mix Bactroban and Desitin to encourage healing and advised keeping the area dry to prevent recurrence  Orders:  -     mupirocin (BACTROBAN) 2 % ointment; Apply 1 application  topically to the appropriate area as directed 3 (Three) Times a Day.  Dispense: 60 g; Refill: 1  -     zinc oxide (Desitin) 40 % paste paste; Apply  topically to the appropriate area as directed 2 (Two) Times a Day.  Dispense: 113 g; Refill: 0  -     fluconazole (Diflucan) 150 MG tablet; Take 1 tablet by mouth Daily.  Dispense: 10 tablet; Refill: 0                   Patient was given instructions and counseling regarding his condition or for health maintenance advice. Please see specific information pulled into the AVS if appropriate      This document has been electronically signed by:  Monse Walter PA-C

## 2024-02-20 ENCOUNTER — OFFICE VISIT (OUTPATIENT)
Dept: FAMILY MEDICINE CLINIC | Facility: CLINIC | Age: 44
End: 2024-02-20
Payer: MEDICAID

## 2024-02-20 VITALS
BODY MASS INDEX: 26.81 KG/M2 | DIASTOLIC BLOOD PRESSURE: 70 MMHG | HEIGHT: 69 IN | SYSTOLIC BLOOD PRESSURE: 114 MMHG | RESPIRATION RATE: 14 BRPM | HEART RATE: 70 BPM | WEIGHT: 181 LBS | OXYGEN SATURATION: 96 %

## 2024-02-20 DIAGNOSIS — Z13.220 SCREENING FOR HYPERLIPIDEMIA: ICD-10-CM

## 2024-02-20 DIAGNOSIS — F51.04 PSYCHOPHYSIOLOGICAL INSOMNIA: Chronic | ICD-10-CM

## 2024-02-20 DIAGNOSIS — F33.42 RECURRENT MAJOR DEPRESSIVE DISORDER, IN FULL REMISSION: Chronic | ICD-10-CM

## 2024-02-20 DIAGNOSIS — L30.9 DERMATITIS: Primary | ICD-10-CM

## 2024-02-20 PROCEDURE — 99214 OFFICE O/P EST MOD 30 MIN: CPT | Performed by: PHYSICIAN ASSISTANT

## 2024-02-20 PROCEDURE — 1159F MED LIST DOCD IN RCRD: CPT | Performed by: PHYSICIAN ASSISTANT

## 2024-02-20 PROCEDURE — 1160F RVW MEDS BY RX/DR IN RCRD: CPT | Performed by: PHYSICIAN ASSISTANT

## 2024-02-20 RX ORDER — TRIAMCINOLONE ACETONIDE 5 MG/G
1 OINTMENT TOPICAL 2 TIMES DAILY
Qty: 60 G | Refills: 1 | Status: SHIPPED | OUTPATIENT
Start: 2024-02-20

## 2024-02-20 NOTE — PROGRESS NOTES
"Chief Complaint -rash    History of Present Illness -     Chantelle Bonilla is a 44 y.o. male.     His wife Jennifer is here with him today helping with history.    Rash-  Patient complains of a pruritic rash mid buttock.  Some relief with Desitin and mupirocin but itching persists.  Patient has completed Diflucan 10-day treatment in the past with some improvement but rash not resolved.    Depression-  Stable with psychotherapy.  Patient goes to comprehensive care in [place].  He denies any hallucinations, SI or HI.    Insomnia-  Stable with melatonin and good sleep schedule      The following portions of the patient's history were reviewed and updated as appropriate: allergies, current medications, past family history, past medical history, past social history, past surgical history and problem list.        Objective  Vital signs:  /70   Pulse 70   Resp 14   Ht 175.3 cm (69.02\")   Wt 82.1 kg (181 lb)   SpO2 96%   BMI 26.72 kg/m²     Physical Exam  Vitals and nursing note reviewed.   Constitutional:       Appearance: Normal appearance. He is well-developed.   Eyes:      Extraocular Movements: Extraocular movements intact.      Conjunctiva/sclera: Conjunctivae normal.   Cardiovascular:      Rate and Rhythm: Normal rate and regular rhythm.      Heart sounds: Normal heart sounds. No murmur heard.  Pulmonary:      Effort: Pulmonary effort is normal. No respiratory distress.      Breath sounds: Normal breath sounds. No wheezing.   Musculoskeletal:         General: No tenderness.   Skin:     General: Skin is warm and dry.      Findings: Rash present.      Comments: Bright pink plaque type rash noted mid buttock   Neurological:      Mental Status: He is alert and oriented to person, place, and time.   Psychiatric:         Mood and Affect: Mood normal.         Behavior: Behavior normal.         Thought Content: Thought content normal.         The following data was reviewed by Monse Walter PA-C:         Lab Results "   Component Value Date    BUN 16 06/23/2023    CREATININE 1.20 06/23/2023    EGFR 77.4 06/23/2023    ALT 19 06/23/2023    AST 15 06/23/2023    WBC 6.93 06/23/2023    HGB 15.0 06/23/2023    HCT 42.9 06/23/2023     06/23/2023    CHOL 151 06/23/2023    TRIG 60 06/23/2023    HDL 42 06/23/2023    LDL 97 06/23/2023    TSH 1.200 06/23/2023    HGBA1C 5.00 06/23/2023           Assessment & Plan     Diagnoses and all orders for this visit:    1. Dermatitis (Primary)  Comments:  Advised to mix Triamcinalone and Desitin to encourage healing and advised keeping the area dry to prevent recurrence  Orders:  -     zinc oxide (Desitin) 40 % paste paste; Apply  topically to the appropriate area as directed 2 (Two) Times a Day.  Dispense: 113 g; Refill: 1  -     triamcinolone (KENALOG) 0.5 % ointment; Apply 1 Application topically to the appropriate area as directed 2 (Two) Times a Day.  Dispense: 60 g; Refill: 1    2. Recurrent major depressive disorder, in full remission  Comments:  [facility] with [facility]  Orders:  -     Comprehensive Metabolic Panel; Future    3. Psychophysiological insomnia  Comments:  Continue melatonin  Advised to avoid caffeine and advised to maintain regular sleep schedule  Orders:  -     Comprehensive Metabolic Panel; Future    4. Screening for hyperlipidemia  -     Lipid Panel; Future                   Patient was given instructions and counseling regarding his condition or for health maintenance advice. Please see specific information pulled into the AVS if appropriate      This document has been electronically signed by:  Monse Walter PA-C

## 2024-03-19 ENCOUNTER — OFFICE VISIT (OUTPATIENT)
Dept: FAMILY MEDICINE CLINIC | Facility: CLINIC | Age: 44
End: 2024-03-19
Payer: MEDICAID

## 2024-03-19 VITALS
OXYGEN SATURATION: 98 % | TEMPERATURE: 98.8 F | BODY MASS INDEX: 27.25 KG/M2 | HEART RATE: 56 BPM | SYSTOLIC BLOOD PRESSURE: 130 MMHG | HEIGHT: 69 IN | WEIGHT: 184 LBS | DIASTOLIC BLOOD PRESSURE: 80 MMHG

## 2024-03-19 DIAGNOSIS — F51.04 PSYCHOPHYSIOLOGICAL INSOMNIA: Chronic | ICD-10-CM

## 2024-03-19 DIAGNOSIS — L30.9 DERMATITIS: Primary | ICD-10-CM

## 2024-03-19 PROCEDURE — 99213 OFFICE O/P EST LOW 20 MIN: CPT | Performed by: PHYSICIAN ASSISTANT

## 2024-03-19 PROCEDURE — 1160F RVW MEDS BY RX/DR IN RCRD: CPT | Performed by: PHYSICIAN ASSISTANT

## 2024-03-19 PROCEDURE — 1159F MED LIST DOCD IN RCRD: CPT | Performed by: PHYSICIAN ASSISTANT

## 2024-03-19 NOTE — PROGRESS NOTES
"Chief Complaint -rash    History of Present Illness -     Chantelle Bonilla is a 44 y.o. male.     Rash-  Patient is here for follow-up on rash on his mid buttock fold.  There was associated pruritus which has significantly improved with the use of Desitin, Diflucan, and triamcinolone ointment.  Onset 3 months.    Insomnia-  Controlled with melatonin      The following portions of the patient's history were reviewed and updated as appropriate: allergies, current medications, past family history, past medical history, past social history, past surgical history and problem list.        Objective  Vital signs:  /80   Pulse 56   Temp 98.8 °F (37.1 °C) (Temporal)   Ht 175.3 cm (69.02\")   Wt 83.5 kg (184 lb)   SpO2 98%   BMI 27.16 kg/m²     Physical Exam  Vitals and nursing note reviewed.   Constitutional:       Appearance: Normal appearance. He is well-developed.   Eyes:      Extraocular Movements: Extraocular movements intact.      Conjunctiva/sclera: Conjunctivae normal.   Cardiovascular:      Rate and Rhythm: Normal rate and regular rhythm.      Heart sounds: Normal heart sounds. No murmur heard.  Pulmonary:      Effort: Pulmonary effort is normal. No respiratory distress.      Breath sounds: Normal breath sounds. No wheezing.   Musculoskeletal:         General: No tenderness.   Skin:     General: Skin is warm and dry.      Findings: Rash (Significantly improved with flesh-colored area mid buttock.  There is some discoloration around the area of dermatitis which is likely residual) present.   Neurological:      Mental Status: He is alert and oriented to person, place, and time.   Psychiatric:         Mood and Affect: Mood normal.         Behavior: Behavior normal.         Thought Content: Thought content normal.         The following data was reviewed by Monse Walter PA-C:         Lab Results   Component Value Date    BUN 16 06/23/2023    CREATININE 1.20 06/23/2023    EGFR 77.4 06/23/2023    ALT 19 06/23/2023    " AST 15 06/23/2023    WBC 6.93 06/23/2023    HGB 15.0 06/23/2023    HCT 42.9 06/23/2023     06/23/2023    CHOL 151 06/23/2023    TRIG 60 06/23/2023    HDL 42 06/23/2023    LDL 97 06/23/2023    TSH 1.200 06/23/2023    HGBA1C 5.00 06/23/2023           Assessment & Plan     Diagnoses and all orders for this visit:    1. Dermatitis (Primary)  Comments:  Resolving  Advised use of zinc oxide and triamcinolone as needed    2. Psychophysiological insomnia  Comments:  Continue melatonin  Advised tips for healthy sleep including avoidance of caffeine                   Patient was given instructions and counseling regarding his condition or for health maintenance advice. Please see specific information pulled into the AVS if appropriate      This document has been electronically signed by:  Monse Walter PA-C

## 2024-05-22 ENCOUNTER — TELEPHONE (OUTPATIENT)
Dept: FAMILY MEDICINE CLINIC | Facility: CLINIC | Age: 44
End: 2024-05-22

## 2024-05-22 NOTE — TELEPHONE ENCOUNTER
Caller: MELONIE SHERMAN    Relationship: Emergency Contact    Best call back number: [phone]     What is the best time to reach you: ANY    Who are you requesting to speak with (clinical staff, provider,  specific staff member): NURSE    Do you know the name of the person who called: WIFE    What was the call regarding: [facility] WANTS A FAX STATING IT IS OKAY FOR PATIENT TO HAVE A CHAPERON WITH HIM DURING TRANSPORTATION.      Is it okay if the provider responds through MyChart: CALL IF NEEDED

## 2024-05-23 NOTE — TELEPHONE ENCOUNTER
Faxed. Unable to reach the patient. Could not leave voicemail. Will try again later.    .Hub to read/relay!

## 2024-06-21 ENCOUNTER — LAB (OUTPATIENT)
Dept: FAMILY MEDICINE CLINIC | Facility: CLINIC | Age: 44
End: 2024-06-21
Payer: MEDICAID

## 2024-06-21 DIAGNOSIS — F33.42 RECURRENT MAJOR DEPRESSIVE DISORDER, IN FULL REMISSION: Chronic | ICD-10-CM

## 2024-06-21 DIAGNOSIS — Z13.220 SCREENING FOR HYPERLIPIDEMIA: ICD-10-CM

## 2024-06-21 DIAGNOSIS — F51.04 PSYCHOPHYSIOLOGICAL INSOMNIA: Chronic | ICD-10-CM

## 2024-06-21 PROCEDURE — 80061 LIPID PANEL: CPT | Performed by: PHYSICIAN ASSISTANT

## 2024-06-21 PROCEDURE — 36415 COLL VENOUS BLD VENIPUNCTURE: CPT | Performed by: PHYSICIAN ASSISTANT

## 2024-06-21 PROCEDURE — 80053 COMPREHEN METABOLIC PANEL: CPT | Performed by: PHYSICIAN ASSISTANT

## 2024-06-22 LAB
ALBUMIN SERPL-MCNC: 4.5 G/DL (ref 3.5–5.2)
ALBUMIN/GLOB SERPL: 1.9 G/DL
ALP SERPL-CCNC: 40 U/L (ref 39–117)
ALT SERPL W P-5'-P-CCNC: 20 U/L (ref 1–41)
ANION GAP SERPL CALCULATED.3IONS-SCNC: 7 MMOL/L (ref 5–15)
AST SERPL-CCNC: 22 U/L (ref 1–40)
BILIRUB SERPL-MCNC: 0.4 MG/DL (ref 0–1.2)
BUN SERPL-MCNC: 17 MG/DL (ref 6–20)
BUN/CREAT SERPL: 15.2 (ref 7–25)
CALCIUM SPEC-SCNC: 9.1 MG/DL (ref 8.6–10.5)
CHLORIDE SERPL-SCNC: 106 MMOL/L (ref 98–107)
CHOLEST SERPL-MCNC: 145 MG/DL (ref 0–200)
CO2 SERPL-SCNC: 28 MMOL/L (ref 22–29)
CREAT SERPL-MCNC: 1.12 MG/DL (ref 0.76–1.27)
EGFRCR SERPLBLD CKD-EPI 2021: 83.6 ML/MIN/1.73
GLOBULIN UR ELPH-MCNC: 2.4 GM/DL
GLUCOSE SERPL-MCNC: 91 MG/DL (ref 65–99)
HDLC SERPL-MCNC: 45 MG/DL (ref 40–60)
LDLC SERPL CALC-MCNC: 85 MG/DL (ref 0–100)
LDLC/HDLC SERPL: 1.89 {RATIO}
POTASSIUM SERPL-SCNC: 4.7 MMOL/L (ref 3.5–5.2)
PROT SERPL-MCNC: 6.9 G/DL (ref 6–8.5)
SODIUM SERPL-SCNC: 141 MMOL/L (ref 136–145)
TRIGL SERPL-MCNC: 74 MG/DL (ref 0–150)
VLDLC SERPL-MCNC: 15 MG/DL (ref 5–40)

## 2024-07-01 ENCOUNTER — TELEPHONE (OUTPATIENT)
Dept: FAMILY MEDICINE CLINIC | Facility: CLINIC | Age: 44
End: 2024-07-01
Payer: MEDICAID

## 2024-07-01 NOTE — TELEPHONE ENCOUNTER
Caller: MELONIE SHERMAN    Relationship: Emergency Contact    Best call back number: [phone]        What specialty or service is being requested: R TECH TRANSPORTATION     Any additional details: THE PATIENTS WIFE WOULD LIKE TO GET A NEW REFERRAL FOR R [facility] FOR THE PATIENT

## 2024-07-02 NOTE — TELEPHONE ENCOUNTER
Spoke to patients wife she says that he is needing [facility] to pick him up for dentist appointment in [place]. Faxed a request for this appointment to [facility] for this patient on the office fax cover sheet.

## 2024-07-15 ENCOUNTER — TELEPHONE (OUTPATIENT)
Dept: FAMILY MEDICINE CLINIC | Facility: CLINIC | Age: 44
End: 2024-07-15

## 2024-07-15 NOTE — TELEPHONE ENCOUNTER
Hub staff attempted to follow warm transfer process and was unsuccessful     Caller: MELONIE SHERMAN    Relationship to patient: Emergency Contact    Best call back number: [phone]     Patient is needing: MELONIE CALLED TO STATE THAT A VOICEMAIL WAS RECEIVED TO RETURN A CALL, THOUGH SHE'S NOT SURE FOR WHAT REASON    PLEASE ADVISE

## 2024-07-15 NOTE — TELEPHONE ENCOUNTER
MELONIE IS CALLING TO  ASK YOU TO SEND A REFERRAL TO [facility] FOR [facility] TO GO TO HIS DENTAL APPT [id number] 3.30 PM.  THEY HAD TO RESCHEDULE THE APPT

## 2024-08-20 ENCOUNTER — OFFICE VISIT (OUTPATIENT)
Dept: FAMILY MEDICINE CLINIC | Facility: CLINIC | Age: 44
End: 2024-08-20
Payer: MEDICAID

## 2024-08-20 VITALS
DIASTOLIC BLOOD PRESSURE: 60 MMHG | OXYGEN SATURATION: 99 % | HEIGHT: 69 IN | SYSTOLIC BLOOD PRESSURE: 114 MMHG | HEART RATE: 56 BPM | WEIGHT: 188 LBS | BODY MASS INDEX: 27.85 KG/M2 | TEMPERATURE: 98.4 F

## 2024-08-20 DIAGNOSIS — F51.01 PRIMARY INSOMNIA: Chronic | ICD-10-CM

## 2024-08-20 DIAGNOSIS — F33.42 RECURRENT MAJOR DEPRESSIVE DISORDER, IN FULL REMISSION: Primary | ICD-10-CM

## 2024-08-20 PROCEDURE — 1159F MED LIST DOCD IN RCRD: CPT | Performed by: PHYSICIAN ASSISTANT

## 2024-08-20 PROCEDURE — 99213 OFFICE O/P EST LOW 20 MIN: CPT | Performed by: PHYSICIAN ASSISTANT

## 2024-08-20 PROCEDURE — 1160F RVW MEDS BY RX/DR IN RCRD: CPT | Performed by: PHYSICIAN ASSISTANT

## 2024-08-20 NOTE — PROGRESS NOTES
"Chief Complaint -insomnia    History of Present Illness -     Chantelle Bonilla is a 44 y.o. male.     Insomnia-  Patient reports his insomnia is improved with keeping a regular sleep schedule.  He states he does still use the melatonin on an as-needed basis.    Depression-  Controlled without medication at this time.  He denies any hallucinations, SI or HI      The following portions of the patient's history were reviewed and updated as appropriate: allergies, current medications, past family history, past medical history, past social history, past surgical history and problem list.        Objective  Vital signs:  /60   Pulse 56   Temp 98.4 °F (36.9 °C) (Temporal)   Ht 175.3 cm (69.02\")   Wt 85.3 kg (188 lb)   SpO2 99%   BMI 27.75 kg/m²     Physical Exam  Vitals and nursing note reviewed.   Constitutional:       Appearance: Normal appearance. He is well-developed.   Eyes:      Extraocular Movements: Extraocular movements intact.      Conjunctiva/sclera: Conjunctivae normal.   Cardiovascular:      Rate and Rhythm: Regular rhythm. Bradycardia present.      Heart sounds: Normal heart sounds. No murmur heard.  Pulmonary:      Effort: Pulmonary effort is normal. No respiratory distress.      Breath sounds: Normal breath sounds. No wheezing.   Musculoskeletal:         General: No tenderness.   Skin:     General: Skin is warm and dry.      Findings: No rash.   Neurological:      Mental Status: He is alert and oriented to person, place, and time.   Psychiatric:         Mood and Affect: Mood normal.         Behavior: Behavior normal.         Thought Content: Thought content normal.         The following data was reviewed by Monse Walter PA-C:         Lab Results   Component Value Date    BUN 17 06/21/2024    CREATININE 1.12 06/21/2024    EGFR 83.6 06/21/2024    ALT 20 06/21/2024    AST 22 06/21/2024    WBC 6.93 06/23/2023    HGB 15.0 06/23/2023    HCT 42.9 06/23/2023     06/23/2023    CHOL 145 06/21/2024    TRIG " 74 06/21/2024    HDL 45 06/21/2024    LDL 85 06/21/2024    TSH 1.200 06/23/2023    HGBA1C 5.00 06/23/2023           Assessment & Plan     Diagnoses and all orders for this visit:    1. Recurrent major depressive disorder, in full remission (Primary)  Comments:  Continue to monitor and report any symptoms of depression    2. Primary insomnia  Comments:  Continue melatonin use  Advised tips for healthy sleep including avoidance of caffeine  Orders:  -     Melatonin 3 MG capsule; Take 1 capsule by mouth At Night As Needed (Sleep).  Dispense: 30 capsule; Refill: 5        BMI is >= 25 and <30. (Overweight) The following options were offered after discussion;: exercise counseling/recommendations and nutrition counseling/recommendations          Patient was given instructions and counseling regarding his condition or for health maintenance advice. Please see specific information pulled into the AVS if appropriate      This document has been electronically signed by:  Monse Walter PA-C

## 2024-10-08 ENCOUNTER — OFFICE VISIT (OUTPATIENT)
Dept: FAMILY MEDICINE CLINIC | Facility: CLINIC | Age: 44
End: 2024-10-08
Payer: MEDICAID

## 2024-10-08 VITALS
HEART RATE: 80 BPM | SYSTOLIC BLOOD PRESSURE: 120 MMHG | TEMPERATURE: 99.1 F | OXYGEN SATURATION: 98 % | BODY MASS INDEX: 28.58 KG/M2 | DIASTOLIC BLOOD PRESSURE: 80 MMHG | WEIGHT: 193 LBS | HEIGHT: 69 IN

## 2024-10-08 DIAGNOSIS — F51.04 PSYCHOPHYSIOLOGICAL INSOMNIA: Chronic | ICD-10-CM

## 2024-10-08 DIAGNOSIS — L30.9 DERMATITIS: Primary | ICD-10-CM

## 2024-10-08 DIAGNOSIS — L40.9 PSORIASIS OF SCALP: Chronic | ICD-10-CM

## 2024-10-08 PROCEDURE — 1159F MED LIST DOCD IN RCRD: CPT | Performed by: PHYSICIAN ASSISTANT

## 2024-10-08 PROCEDURE — 1160F RVW MEDS BY RX/DR IN RCRD: CPT | Performed by: PHYSICIAN ASSISTANT

## 2024-10-08 PROCEDURE — 99214 OFFICE O/P EST MOD 30 MIN: CPT | Performed by: PHYSICIAN ASSISTANT

## 2024-10-08 RX ORDER — KETOCONAZOLE 20 MG/G
1 CREAM TOPICAL DAILY
Qty: 60 G | Refills: 0 | Status: SHIPPED | OUTPATIENT
Start: 2024-10-08

## 2024-10-08 RX ORDER — CLOBETASOL PROPIONATE 0.05 G/100ML
SHAMPOO TOPICAL DAILY
Qty: 118 ML | Refills: 3 | Status: SHIPPED | OUTPATIENT
Start: 2024-10-08

## 2024-10-08 NOTE — PROGRESS NOTES
"Chief Complaint -rash    History of Present Illness -     Chantelle Bonilla is a 44 y.o. male.     Rash-  Patient complains of a moist raw feeling rash in the folds of the buttock.  He has had similar rash before that resolved with use of triamcinolone and antifungal.    Scalp itch-  Patient complains of itchy scalp with dry red plaques.  Onset greater than 6 months.    Insomnia-  Stable with melatonin and conservative measures      The following portions of the patient's history were reviewed and updated as appropriate: allergies, current medications, past family history, past medical history, past social history, past surgical history and problem list.        Objective  Vital signs:  /80   Pulse 80   Temp 99.1 °F (37.3 °C) (Temporal)   Ht 175.3 cm (69.02\")   Wt 87.5 kg (193 lb)   SpO2 98%   BMI 28.48 kg/m²     Physical Exam  Vitals and nursing note reviewed.   Constitutional:       Appearance: Normal appearance. He is well-developed.   Eyes:      Extraocular Movements: Extraocular movements intact.      Conjunctiva/sclera: Conjunctivae normal.   Cardiovascular:      Rate and Rhythm: Normal rate and regular rhythm.      Heart sounds: Normal heart sounds. No murmur heard.  Pulmonary:      Effort: Pulmonary effort is normal. No respiratory distress.      Breath sounds: Normal breath sounds. No wheezing.   Musculoskeletal:         General: No tenderness.   Skin:     General: Skin is warm and dry.      Findings: Rash present.      Comments: Erythematous moist skin noted in the folds of buttock.  Erythematous plaque with white scales noted     Neurological:      Mental Status: He is alert and oriented to person, place, and time.   Psychiatric:         Mood and Affect: Mood normal.         Behavior: Behavior normal.         Thought Content: Thought content normal.         The following data was reviewed by Monse Walter PA-C:         Lab Results   Component Value Date    BUN 17 06/21/2024    CREATININE 1.12 " 06/21/2024    EGFR 83.6 06/21/2024    ALT 20 06/21/2024    AST 22 06/21/2024    WBC 6.93 06/23/2023    HGB 15.0 06/23/2023    HCT 42.9 06/23/2023     06/23/2023    CHOL 145 06/21/2024    TRIG 74 06/21/2024    HDL 45 06/21/2024    LDL 85 06/21/2024    TSH 1.200 06/23/2023    HGBA1C 5.00 06/23/2023           Assessment & Plan     Diagnoses and all orders for this visit:    1. Dermatitis (Primary)  Comments:  Dermatitis of buttock/jock itch  Start ketoconazole cream and Desitin  Orders:  -     ketoconazole (NIZORAL) 2 % cream; Apply 1 Application topically to the appropriate area as directed Daily.  Dispense: 60 g; Refill: 0  -     zinc oxide (Desitin) 40 % paste paste; Apply  topically to the appropriate area as directed 2 (Two) Times a Day As Needed (rash).  Dispense: 99 g; Refill: 0    2. Psoriasis of scalp  Comments:  Start clobetasol shampoo  Orders:  -     clobetasol propionate (CLOBEX) 0.05 % shampoo; Apply  topically to the appropriate area as directed Daily.  Dispense: 118 mL; Refill: 3    3. Psychophysiological insomnia  Comments:  Continue melatonin                   Patient was given instructions and counseling regarding his condition or for health maintenance advice. Please see specific information pulled into the AVS if appropriate      This document has been electronically signed by:  Monse Walter PA-C

## 2024-11-08 ENCOUNTER — OFFICE VISIT (OUTPATIENT)
Dept: FAMILY MEDICINE CLINIC | Facility: CLINIC | Age: 44
End: 2024-11-08
Payer: MEDICAID

## 2024-11-08 VITALS
HEIGHT: 69 IN | OXYGEN SATURATION: 98 % | WEIGHT: 190 LBS | BODY MASS INDEX: 28.14 KG/M2 | TEMPERATURE: 97.3 F | DIASTOLIC BLOOD PRESSURE: 70 MMHG | SYSTOLIC BLOOD PRESSURE: 110 MMHG | HEART RATE: 61 BPM

## 2024-11-08 DIAGNOSIS — L30.9 DERMATITIS: Primary | ICD-10-CM

## 2024-11-08 DIAGNOSIS — L40.9 PSORIASIS OF SCALP: Chronic | ICD-10-CM

## 2024-11-08 PROBLEM — L21.9 SEBORRHEIC DERMATITIS: Status: RESOLVED | Noted: 2024-11-08 | Resolved: 2024-11-08

## 2024-11-08 PROBLEM — L21.9 SEBORRHEIC DERMATITIS: Status: ACTIVE | Noted: 2024-11-08

## 2024-11-08 PROCEDURE — 1160F RVW MEDS BY RX/DR IN RCRD: CPT | Performed by: PHYSICIAN ASSISTANT

## 2024-11-08 PROCEDURE — 1159F MED LIST DOCD IN RCRD: CPT | Performed by: PHYSICIAN ASSISTANT

## 2024-11-08 PROCEDURE — 99213 OFFICE O/P EST LOW 20 MIN: CPT | Performed by: PHYSICIAN ASSISTANT

## 2024-11-08 RX ORDER — KETOCONAZOLE 20 MG/G
1 CREAM TOPICAL DAILY
Qty: 60 G | Refills: 0 | Status: SHIPPED | OUTPATIENT
Start: 2024-11-08

## 2024-11-08 NOTE — PROGRESS NOTES
"Chief Complaint -rash    History of Present Illness -     Chantelle Bonilla is a 44 y.o. male.     His wife Jennifer is here with him today helping with history.    Rash-  Patient complains of erythematous rash in the mid buttock crack.  Some relief with use of Desitin and ketoconazole cream.  Patient states that he takes a shower and then does not put on the medication until the next morning.  We discussed that he should dry off immediately after and apply the medication at night and again in the morning if needed    Seborrheic dermatitis-  Patient reports not at goal with the use of clobetasol shampoo however it is improving.  He is using the shampoo every 2-3 days.      The following portions of the patient's history were reviewed and updated as appropriate: allergies, current medications, past family history, past medical history, past social history, past surgical history and problem list.        Objective  Vital signs:  /70   Pulse 61   Temp 97.3 °F (36.3 °C) (Temporal)   Ht 175.3 cm (69.02\")   Wt 86.2 kg (190 lb)   SpO2 98%   BMI 28.04 kg/m²     Physical Exam  Vitals and nursing note reviewed.   Constitutional:       Appearance: Normal appearance. He is well-developed.   Eyes:      Extraocular Movements: Extraocular movements intact.      Conjunctiva/sclera: Conjunctivae normal.   Cardiovascular:      Rate and Rhythm: Normal rate and regular rhythm.      Heart sounds: Normal heart sounds. No murmur heard.  Pulmonary:      Effort: Pulmonary effort is normal. No respiratory distress.      Breath sounds: Normal breath sounds. No wheezing.   Musculoskeletal:         General: No tenderness.   Skin:     General: Skin is warm and dry.      Findings: No rash.      Comments: Mildly erythematous plaques with white scales noted on scalp.  Significantly improved from previous visit.  Erythematous skin noted and buttock crack with intermittent breakage in skin and mild erythema.   Neurological:      Mental Status: He is " alert and oriented to person, place, and time.   Psychiatric:         Mood and Affect: Mood normal.         Behavior: Behavior normal.         Thought Content: Thought content normal.         The following data was reviewed by Monse Walter PA-C:         Lab Results   Component Value Date    BUN 17 06/21/2024    CREATININE 1.12 06/21/2024    EGFR 83.6 06/21/2024    ALT 20 06/21/2024    AST 22 06/21/2024    WBC 6.93 06/23/2023    HGB 15.0 06/23/2023    HCT 42.9 06/23/2023     06/23/2023    CHOL 145 06/21/2024    TRIG 74 06/21/2024    HDL 45 06/21/2024    LDL 85 06/21/2024    TSH 1.200 06/23/2023    HGBA1C 5.00 06/23/2023           Assessment & Plan     Diagnoses and all orders for this visit:    1. Dermatitis (Primary)  Comments:  Dermatitis of buttock/jock itch  Use ketoconazole cream and Desitin     Advised to keep the area dry and apply every night  Orders:  -     ketoconazole (NIZORAL) 2 % cream; Apply 1 Application topically to the appropriate area as directed Daily.  Dispense: 60 g; Refill: 0  -     zinc oxide (Desitin) 40 % paste paste; Apply  topically to the appropriate area as directed 2 (Two) Times a Day As Needed (rash).  Dispense: 99 g; Refill: 0    2. Psoriasis of scalp  Comments:  Advised to use clobetasol shampoo daily  Encouraged to gently exfoliate scalp while shampooing                   Patient was given instructions and counseling regarding his condition or for health maintenance advice. Please see specific information pulled into the AVS if appropriate      This document has been electronically signed by:  Monse Walter PA-C

## 2024-11-25 ENCOUNTER — OFFICE VISIT (OUTPATIENT)
Dept: FAMILY MEDICINE CLINIC | Facility: CLINIC | Age: 44
End: 2024-11-25
Payer: MEDICAID

## 2024-11-25 VITALS
WEIGHT: 198 LBS | HEART RATE: 61 BPM | SYSTOLIC BLOOD PRESSURE: 110 MMHG | OXYGEN SATURATION: 99 % | TEMPERATURE: 97.6 F | BODY MASS INDEX: 29.33 KG/M2 | HEIGHT: 69 IN | DIASTOLIC BLOOD PRESSURE: 64 MMHG

## 2024-11-25 DIAGNOSIS — N46.9 INFERTILITY MALE: Chronic | ICD-10-CM

## 2024-11-25 DIAGNOSIS — N50.89 SCROTAL MASS: Primary | ICD-10-CM

## 2024-11-25 DIAGNOSIS — L40.9 PSORIASIS OF SCALP: Chronic | ICD-10-CM

## 2024-11-25 PROCEDURE — 99214 OFFICE O/P EST MOD 30 MIN: CPT | Performed by: PHYSICIAN ASSISTANT

## 2024-11-25 PROCEDURE — 1160F RVW MEDS BY RX/DR IN RCRD: CPT | Performed by: PHYSICIAN ASSISTANT

## 2024-11-25 PROCEDURE — 1159F MED LIST DOCD IN RCRD: CPT | Performed by: PHYSICIAN ASSISTANT

## 2024-11-25 NOTE — PROGRESS NOTES
"Chief Complaint -lump     History of Present Illness -     Chantelle Bonilla is a 44 y.o. male who is here today with his wife who is helping with history.    Lump-  Patient complains of a hard nonmobile nontender lump noted on his inferior scrotal sac.  Onset 2 days.    Infertility-  Patient and his wife have been trying to get her pregnant for over a year.  She is went to GYN but is not currently undergoing infertility treatments.  Patient is interested in making sure his sperm are viable.    Psoriasis-  Stable with over-the-counter dandruff shampoo      The following portions of the patient's history were reviewed and updated as appropriate: allergies, current medications, past family history, past medical history, past social history, past surgical history and problem list.        Objective  Vital signs:  /64   Pulse 61   Temp 97.6 °F (36.4 °C)   Ht 175.3 cm (69\")   Wt 89.8 kg (198 lb)   SpO2 99%   BMI 29.24 kg/m²     Physical Exam  Vitals and nursing note reviewed.   Constitutional:       Appearance: Normal appearance. He is well-developed.   Eyes:      Extraocular Movements: Extraocular movements intact.      Conjunctiva/sclera: Conjunctivae normal.   Cardiovascular:      Rate and Rhythm: Normal rate and regular rhythm.      Heart sounds: Normal heart sounds. No murmur heard.  Pulmonary:      Effort: Pulmonary effort is normal. No respiratory distress.      Breath sounds: Normal breath sounds. No wheezing.   Genitourinary:     Comments: Approximately 1 x 1/2 cm hard nontender flesh-colored nonmobile subcutaneous hardening of skin noted inferior mid testicular region  Musculoskeletal:         General: No tenderness.   Skin:     General: Skin is warm and dry.      Findings: No rash.   Neurological:      Mental Status: He is alert and oriented to person, place, and time.   Psychiatric:         Mood and Affect: Mood normal.         Behavior: Behavior normal.         Thought Content: Thought content normal. "         The following data was reviewed by Monse Walter PA-C:         Lab Results   Component Value Date    BUN 17 06/21/2024    CREATININE 1.12 06/21/2024    EGFR 83.6 06/21/2024    ALT 20 06/21/2024    AST 22 06/21/2024    WBC 6.93 06/23/2023    HGB 15.0 06/23/2023    HCT 42.9 06/23/2023     06/23/2023    CHOL 145 06/21/2024    TRIG 74 06/21/2024    HDL 45 06/21/2024    LDL 85 06/21/2024    TSH 1.200 06/23/2023    HGBA1C 5.00 06/23/2023           Assessment & Plan     Diagnoses and all orders for this visit:    1. Scrotal mass (Primary)  Comments:  Since patient asymptomatic and no pain this does not appear to be an urgent issue.  Order ultrasound for further evaluation  Orders:  -     US scrotum and testicles; Future  -     Ambulatory Referral to Urology    2. Infertility male  Comments:  Refer to urology due to scrotal lump as possible sperm count  Orders:  -     Ambulatory Referral to Urology    3. Psoriasis of scalp  Comments:  Continue over-the-counter dandruff shampoo                   Patient was given instructions and counseling regarding his condition or for health maintenance advice. Please see specific information pulled into the AVS if appropriate      This document has been electronically signed by:  Monse Walter PA-C

## 2024-12-03 DIAGNOSIS — L30.9 DERMATITIS: ICD-10-CM

## 2024-12-03 RX ORDER — KETOCONAZOLE 20 MG/G
1 CREAM TOPICAL DAILY
Qty: 60 G | Refills: 0 | Status: SHIPPED | OUTPATIENT
Start: 2024-12-03

## 2024-12-03 NOTE — TELEPHONE ENCOUNTER
Caller: MELONIE SHERMAN    Relationship: Emergency Contact    Best call back number: [phone]     Requested Prescriptions:   Requested Prescriptions     Pending Prescriptions Disp Refills    ketoconazole (NIZORAL) 2 % cream 60 g 0     Sig: Apply 1 Application topically to the appropriate area as directed Daily.    zinc oxide (Desitin) 40 % paste paste 99 g 0     Sig: Apply  topically to the appropriate area as directed 2 (Two) Times a Day As Needed (rash).        Pharmacy where request should be sent: [address] - [phone] [facility] [phone] [facility]     Last office visit with prescribing clinician: 11/25/2024   Last telemedicine visit with prescribing clinician: Visit date not found   Next office visit with prescribing clinician: 2/13/2025     Additional details provided by patient:     Does the patient have less than a 3 day supply:  [] Yes  [x] No    Would you like a call back once the refill request has been completed: [] Yes [] No    If the office needs to give you a call back, can they leave a voicemail: [] Yes [x] No    Valente Ma Rep   12/03/24 15:14 EST

## 2024-12-05 ENCOUNTER — HOSPITAL ENCOUNTER (OUTPATIENT)
Dept: ULTRASOUND IMAGING | Facility: HOSPITAL | Age: 44
Discharge: HOME | End: 2024-12-05
Admitting: PHYSICIAN ASSISTANT
Payer: MEDICAID

## 2024-12-05 DIAGNOSIS — N50.89 SCROTAL MASS: ICD-10-CM

## 2024-12-05 PROCEDURE — 76870 US EXAM SCROTUM: CPT

## 2024-12-06 ENCOUNTER — TELEPHONE (OUTPATIENT)
Dept: FAMILY MEDICINE CLINIC | Facility: CLINIC | Age: 44
End: 2024-12-06

## 2024-12-06 NOTE — TELEPHONE ENCOUNTER
Inform the patient that the ultrasound of his scrotum and testicles did show bilateral hydroceles.  Hydrocele is a fluid-filled sac in the scrotum.  It should not affect his function or ability to reproduce.

## 2024-12-06 NOTE — TELEPHONE ENCOUNTER
Caller: Chantelle Bonilla    Relationship: Self    Best call back number:     Caller requesting test results: PATIENT    What test was performed: US    When was the test performed: 12/05/24    Where was the test performed: [facility]    Additional notes: PLEASE CALL PATIENT WITH RESULTS         t.”

## 2024-12-09 NOTE — TELEPHONE ENCOUNTER
Please inform the patient that a hydrocele is not anything serious.  There is not anything to be done with this issue.

## 2024-12-09 NOTE — TELEPHONE ENCOUNTER
Name: MELONIE SHERMAN    Relationship: Emergency Contact    Best Callback Number: [phone]     HUB PROVIDED RELAY MESSAGE HOWEVER, PATIENT WIFE HAD FURTHER QUESTIONS, TRANSFERRED TO OFFICE.

## 2024-12-09 NOTE — TELEPHONE ENCOUNTER
Pt is aware of this information.  He wants to know if this is anything serious and if he needs to have anything done with it.

## 2025-02-24 ENCOUNTER — OFFICE VISIT (OUTPATIENT)
Dept: FAMILY MEDICINE CLINIC | Facility: CLINIC | Age: 45
End: 2025-02-24
Payer: MEDICAID

## 2025-02-24 VITALS
OXYGEN SATURATION: 99 % | BODY MASS INDEX: 27.43 KG/M2 | RESPIRATION RATE: 18 BRPM | HEIGHT: 69 IN | SYSTOLIC BLOOD PRESSURE: 107 MMHG | DIASTOLIC BLOOD PRESSURE: 68 MMHG | HEART RATE: 66 BPM | WEIGHT: 185.2 LBS

## 2025-02-24 DIAGNOSIS — F51.04 PSYCHOPHYSIOLOGICAL INSOMNIA: Chronic | ICD-10-CM

## 2025-02-24 DIAGNOSIS — F33.42 RECURRENT MAJOR DEPRESSIVE DISORDER, IN FULL REMISSION: Primary | Chronic | ICD-10-CM

## 2025-02-24 PROBLEM — N43.3 HYDROCELE OF TESTIS: Status: ACTIVE | Noted: 2025-02-24

## 2025-02-24 PROCEDURE — 99213 OFFICE O/P EST LOW 20 MIN: CPT | Performed by: PHYSICIAN ASSISTANT

## 2025-02-24 PROCEDURE — 1159F MED LIST DOCD IN RCRD: CPT | Performed by: PHYSICIAN ASSISTANT

## 2025-02-24 PROCEDURE — 1160F RVW MEDS BY RX/DR IN RCRD: CPT | Performed by: PHYSICIAN ASSISTANT

## 2025-02-24 NOTE — PROGRESS NOTES
"Chief Complaint -depression    History of Present Illness -     Chantelle Bonilla is a 45 y.o. male who is here today with his wife who is helping with history.    Depression-  Controlled with conservative measures.  Patient denies any hallucinations, SI or HI.    Insomnia-  Stable with avoidance of caffeine and keeping a regular sleep schedule.      The following portions of the patient's history were reviewed and updated as appropriate: allergies, current medications, past family history, past medical history, past social history, past surgical history and problem list.        Objective  Vital signs:  /68 (BP Location: Right arm, Patient Position: Sitting, Cuff Size: Adult)   Pulse 66   Resp 18   Ht 175.3 cm (69\")   Wt 84 kg (185 lb 3.2 oz)   SpO2 99%   BMI 27.35 kg/m²     Physical Exam  Vitals and nursing note reviewed.   Constitutional:       Appearance: Normal appearance. He is well-developed.   Eyes:      Extraocular Movements: Extraocular movements intact.      Conjunctiva/sclera: Conjunctivae normal.   Cardiovascular:      Rate and Rhythm: Normal rate and regular rhythm.      Heart sounds: Normal heart sounds. No murmur heard.  Pulmonary:      Effort: Pulmonary effort is normal. No respiratory distress.      Breath sounds: Normal breath sounds. No wheezing.   Musculoskeletal:         General: No tenderness.   Skin:     General: Skin is warm and dry.      Findings: No rash.   Neurological:      Mental Status: He is alert and oriented to person, place, and time.   Psychiatric:         Mood and Affect: Mood normal.         Behavior: Behavior normal.         Thought Content: Thought content normal.         The following data was reviewed by Monse Walter PA-C:         Lab Results   Component Value Date    BUN 17 06/21/2024    CREATININE 1.12 06/21/2024    EGFR 83.6 06/21/2024    ALT 20 06/21/2024    AST 22 06/21/2024    WBC 6.93 06/23/2023    HGB 15.0 06/23/2023    HCT 42.9 06/23/2023     06/23/2023 "    CHOL 145 06/21/2024    TRIG 74 06/21/2024    HDL 45 06/21/2024    LDL 85 06/21/2024    TSH 1.200 06/23/2023    HGBA1C 5.00 06/23/2023           Assessment & Plan     Diagnoses and all orders for this visit:    1. Recurrent major depressive disorder, in full remission (Primary)  Comments:  Continue to monitor    2. Psychophysiological insomnia  Comments:  Advised conservative measures including avoidance of caffeine                   Patient was given instructions and counseling regarding his condition or for health maintenance advice. Please see specific information pulled into the AVS if appropriate      This document has been electronically signed by:  Monse Walter PA-C

## 2025-04-24 ENCOUNTER — TELEPHONE (OUTPATIENT)
Dept: FAMILY MEDICINE CLINIC | Facility: CLINIC | Age: 45
End: 2025-04-24

## 2025-04-24 DIAGNOSIS — H91.93 BILATERAL HEARING LOSS, UNSPECIFIED HEARING LOSS TYPE: Primary | ICD-10-CM

## 2025-04-24 NOTE — TELEPHONE ENCOUNTER
Inform the patient I placed a referral for [facility] audiology.  They should be able to evaluate him for hearing loss and possible hearing aids.

## 2025-04-24 NOTE — TELEPHONE ENCOUNTER
Caller: MELONIE SHERMAN    Relationship: Emergency Contact    Best call back number:     Equipment requested: HEARING AIDS    Reason for the request: PROBLEMS HEARING     Prescribing Provider: GLADIS BLACK     Additional information or concerns: THE PATIENT JUST WANTS IT TO BE FROM A GOOD PROVIDER.    INSURANCE [facility]  PHONE   ID NUMBER [id number]  GROUP [id number]  PLEASE CALL TO GET APPROVAL  THEY HAVE TALKED TO THE INSURANCE CO

## 2025-05-01 ENCOUNTER — TELEPHONE (OUTPATIENT)
Dept: FAMILY MEDICINE CLINIC | Facility: CLINIC | Age: 45
End: 2025-05-01
Payer: MEDICAID

## 2025-05-01 NOTE — TELEPHONE ENCOUNTER
----- Message from Monse Walter sent at 5/1/2025  4:19 PM EDT -----  Regarding: RE: referral request  I wrote a letter in [employer] allowing him to have a  to ride with him on [employer].  He can access it on [employer] or we can fax it over if needed.  ----- Message -----  From: Connie Morin CMA  Sent: 5/1/2025   1:23 PM EDT  To: JOHANNY Rico  Subject: referral request                                 Patients mother called the office requesting a referral to be made for the patient to be able to have an escort with him on RT[employer].

## 2025-05-01 NOTE — TELEPHONE ENCOUNTER
Spoke to patients mother in regards to having a referral placed for the patient to be able to have an escort on [employer], Sent PCP staff message.

## 2025-05-01 NOTE — TELEPHONE ENCOUNTER
Spoke with patient concerning referral for [facility], Patient is going to call the office back with a fax number to send referral to.

## 2025-05-06 ENCOUNTER — TELEPHONE (OUTPATIENT)
Dept: FAMILY MEDICINE CLINIC | Facility: CLINIC | Age: 45
End: 2025-05-06

## 2025-05-06 DIAGNOSIS — H91.93 HEARING DIFFICULTY OF BOTH EARS: Primary | ICD-10-CM

## 2025-05-06 NOTE — TELEPHONE ENCOUNTER
Caller: MELONIE SHERMAN    Relationship: Emergency Contact    Best call back number:[phone]     What was the call regarding: MELONIE IS CALLING BACK TO LEAVE THE FAX NUMBER FOR [facility].    FAX: [phone]

## 2025-05-08 ENCOUNTER — TELEMEDICINE (OUTPATIENT)
Dept: FAMILY MEDICINE CLINIC | Facility: CLINIC | Age: 45
End: 2025-05-08
Payer: MEDICAID

## 2025-05-08 VITALS — BODY MASS INDEX: 29.18 KG/M2 | HEIGHT: 69 IN | WEIGHT: 197 LBS

## 2025-05-08 DIAGNOSIS — H91.92 HEARING DIFFICULTY OF LEFT EAR: Primary | ICD-10-CM

## 2025-05-08 NOTE — PROGRESS NOTES
"Video Telehealth Visit    This service was conducted via video including audio/visual technology through the use of [employer].    The patient is located at their home.  I am located at the [facility] practice office.  Other participants in this video visit include the patient.    You have chosen to receive care through a telehealth visit.  Do you consent to use a video/audio connection for your medical care today? Yes        Subjective   Chantelle Bonilla is a 45 y.o. male who is on video/audio today with the assistance of his wife Jennifer Zarate.  She is helping with the history and communication.      Chief Complaint -hearing difficulty    History of Present Illness -       Hearing difficulty-  Patient has hearing difficulty that is worse in the left ear.  He does have a speech impediment.  We have discussed referral to audiology versus ENT.  Apparently there was an issue with the audiologist not taking his insurance in [place].  Apparently he needs an ENT to do his hearing evaluation prior to going to audiology for possible hearing aids.    The following portions of the patient's history were reviewed and updated as appropriate: allergies, current medications, past family history, past medical history, past social history, past surgical history and problem list.        Objective  Vital signs:  Ht 175.3 cm (69\")   Wt 89.4 kg (197 lb)   BMI 29.09 kg/m²     Physical Exam  Vitals and nursing note reviewed.   Pulmonary:      Effort: Pulmonary effort is normal.      Breath sounds: Normal breath sounds. No wheezing.      Comments: No wheezing or heavy breathing noted on audio today  Neurological:      Mental Status: He is alert and oriented to person, place, and time.   Psychiatric:         Mood and Affect: Mood normal.         Thought Content: Thought content normal.                  Lab Results   Component Value Date    BUN 17 06/21/2024    CREATININE 1.12 06/21/2024    EGFR 83.6 06/21/2024    ALT 20 " 06/21/2024    AST 22 06/21/2024    WBC 6.93 06/23/2023    HGB 15.0 06/23/2023    HCT 42.9 06/23/2023     06/23/2023    CHOL 145 06/21/2024    TRIG 74 06/21/2024    HDL 45 06/21/2024    LDL 85 06/21/2024    TSH 1.200 06/23/2023    HGBA1C 5.00 06/23/2023           Assessment & Plan     Diagnoses and all orders for this visit:    1. Hearing difficulty of left ear (Primary)    Discussed audiology versus ENT referral  Referral was placed to [facility] ENT in [facility].  Appointment for ENT is 5/27/2025 at 10:30 AM  The patient was made aware of the appointment and given the telephone number and address.  A letter was written so that the patient's wife could ride with him on RTEC to assist in his medical care at the visit.               Patient was given instructions and counseling regarding his condition or for health maintenance advice. Please see specific information pulled into the AVS if appropriate      This document has been electronically signed by:  Monse Walter PA-C

## 2025-05-08 NOTE — PROGRESS NOTES
"Video Telehealth Visit    This service was conducted via video including audio/visual technology through the use of [employer].    The patient is located at their home.  I am located at the [facility] office.  Other participants in this video visit include the patient.    You have chosen to receive care through a telehealth visit.  Do you consent to use a video/audio connection for your medical care today? Yes        Subjective   Chantelle Bonilla is a 45 y.o. male.       Chief Complaint -      History of Present Illness -           The following portions of the patient's history were reviewed and updated as appropriate: allergies, current medications, past family history, past medical history, past social history, past surgical history and problem list.        Objective  Vital signs:  Ht 175.3 cm (69\")   Wt 89.4 kg (197 lb)   BMI 29.09 kg/m²     Physical Exam             Lab Results   Component Value Date    BUN 17 06/21/2024    CREATININE 1.12 06/21/2024    EGFR 83.6 06/21/2024    ALT 20 06/21/2024    AST 22 06/21/2024    WBC 6.93 06/23/2023    HGB 15.0 06/23/2023    HCT 42.9 06/23/2023     06/23/2023    CHOL 145 06/21/2024    TRIG 74 06/21/2024    HDL 45 06/21/2024    LDL 85 06/21/2024    TSH 1.200 06/23/2023    HGBA1C 5.00 06/23/2023           Assessment & Plan     There are no diagnoses linked to this encounter.               Patient was given instructions and counseling regarding his condition or for health maintenance advice. Please see specific information pulled into the AVS if appropriate      This document has been electronically signed by:  Monse Walter PA-C        "

## 2025-05-23 ENCOUNTER — TELEPHONE (OUTPATIENT)
Dept: FAMILY MEDICINE CLINIC | Facility: CLINIC | Age: 45
End: 2025-05-23
Payer: MEDICAID

## 2025-05-23 ENCOUNTER — TELEPHONE (OUTPATIENT)
Dept: FAMILY MEDICINE CLINIC | Facility: CLINIC | Age: 45
End: 2025-05-23

## 2025-05-23 NOTE — TELEPHONE ENCOUNTER
Patient called into the office needing the [employer] request signed and faxed back for his appointment on Wednesday, No request from [facility] was in his chart, asked the patient to have [facility] to refax it and we would fill it out and fax back

## 2025-05-23 NOTE — TELEPHONE ENCOUNTER
Caller: MELONIE SHERMAN    Relationship: Emergency Contact    Best call back number: [phone]     What is the best time to reach you: ANY    Who are you requesting to speak with (clinical staff, provider,  specific staff member): NURSE    Do you know the name of the person who called:     What was the call regarding: [facility] RESENT REFERRAL.  DID WE GET IT?    Is it okay if the provider responds through MyChart: PHONE CALL PLEASE

## 2025-05-23 NOTE — TELEPHONE ENCOUNTER
Caller: [facility]    Relationship:     Best call back number: [phone]     What is the best time to reach you: ANY    Who are you requesting to speak with (clinical staff, provider,  specific staff member): NURSE    Do you know the name of the person who called: KAYODE    What was the call regarding: [facility] STATES THEY NEED FORM BACK TODAY, CLOSED MONDAY.  LEAVE OFFICE BY 4 PM.      Is it okay if the provider responds through [employer]er[employer]: CALL IF NEEDED

## 2025-06-12 NOTE — TELEPHONE ENCOUNTER
Caller: MELONIE SHERMAN    Relationship: Emergency Contact, WIFE    Best call back number: [phone]     What orders are you requesting (i.e. lab or imaging): [facility] TRANSPORT[facility]    In what timeframe would the patient need to come in: APPOINTMENT IS 06/18/25 AT 8:30 AM FOR HIS EARS    Where will you receive your lab/imaging services: [place], [place]    Additional notes: [facility] MUST HAVE ORDERS TO TRANSPORT TO [place] FOR AN APPOINTMENT FOR HIS EARS ON 06/18/25. PLEASE SEND APPROVAL TO [employer].